# Patient Record
Sex: MALE | Race: WHITE | NOT HISPANIC OR LATINO | Employment: OTHER | URBAN - METROPOLITAN AREA
[De-identification: names, ages, dates, MRNs, and addresses within clinical notes are randomized per-mention and may not be internally consistent; named-entity substitution may affect disease eponyms.]

---

## 2019-11-13 ENCOUNTER — TRANSCRIBE ORDERS (OUTPATIENT)
Dept: ADMINISTRATIVE | Facility: HOSPITAL | Age: 74
End: 2019-11-13

## 2019-11-13 DIAGNOSIS — M19.011 PRIMARY OSTEOARTHRITIS OF RIGHT SHOULDER: Primary | ICD-10-CM

## 2019-11-18 ENCOUNTER — HOSPITAL ENCOUNTER (OUTPATIENT)
Dept: CT IMAGING | Facility: HOSPITAL | Age: 74
Discharge: HOME | End: 2019-11-18
Admitting: ORTHOPAEDIC SURGERY

## 2019-11-18 ENCOUNTER — APPOINTMENT (OUTPATIENT)
Dept: PREADMISSION TESTING | Facility: HOSPITAL | Age: 74
End: 2019-11-18

## 2019-11-18 ENCOUNTER — HOSPITAL ENCOUNTER (OUTPATIENT)
Dept: GENERAL RADIOLOGY | Facility: HOSPITAL | Age: 74
Discharge: HOME | End: 2019-11-18

## 2019-11-18 VITALS — WEIGHT: 162.92 LBS | BODY MASS INDEX: 24.69 KG/M2 | HEIGHT: 68 IN

## 2019-11-18 DIAGNOSIS — M19.011 PRIMARY OSTEOARTHRITIS OF RIGHT SHOULDER: ICD-10-CM

## 2019-11-18 LAB
ANION GAP SERPL CALCULATED.3IONS-SCNC: 13 MMOL/L (ref 5–15)
BUN BLD-MCNC: 31 MG/DL (ref 8–23)
BUN/CREAT SERPL: 25.4 (ref 7–25)
CALCIUM SPEC-SCNC: 9.8 MG/DL (ref 8.6–10.5)
CHLORIDE SERPL-SCNC: 100 MMOL/L (ref 98–107)
CO2 SERPL-SCNC: 25 MMOL/L (ref 22–29)
CREAT BLD-MCNC: 1.22 MG/DL (ref 0.76–1.27)
DEPRECATED RDW RBC AUTO: 44.4 FL (ref 37–54)
ERYTHROCYTE [DISTWIDTH] IN BLOOD BY AUTOMATED COUNT: 12.7 % (ref 12.3–15.4)
GFR SERPL CREATININE-BSD FRML MDRD: 58 ML/MIN/1.73
GLUCOSE BLD-MCNC: 258 MG/DL (ref 65–99)
HBA1C MFR BLD: 7.1 % (ref 4.8–5.6)
HCT VFR BLD AUTO: 42.1 % (ref 37.5–51)
HGB BLD-MCNC: 13.9 G/DL (ref 13–17.7)
MCH RBC QN AUTO: 31.1 PG (ref 26.6–33)
MCHC RBC AUTO-ENTMCNC: 33 G/DL (ref 31.5–35.7)
MCV RBC AUTO: 94.2 FL (ref 79–97)
PLATELET # BLD AUTO: 268 10*3/MM3 (ref 140–450)
PMV BLD AUTO: 9.5 FL (ref 6–12)
POTASSIUM BLD-SCNC: 4.6 MMOL/L (ref 3.5–5.2)
RBC # BLD AUTO: 4.47 10*6/MM3 (ref 4.14–5.8)
SODIUM BLD-SCNC: 138 MMOL/L (ref 136–145)
WBC NRBC COR # BLD: 8.05 10*3/MM3 (ref 3.4–10.8)

## 2019-11-18 PROCEDURE — 36415 COLL VENOUS BLD VENIPUNCTURE: CPT

## 2019-11-18 PROCEDURE — 85027 COMPLETE CBC AUTOMATED: CPT | Performed by: ORTHOPAEDIC SURGERY

## 2019-11-18 PROCEDURE — 93010 ELECTROCARDIOGRAM REPORT: CPT | Performed by: INTERNAL MEDICINE

## 2019-11-18 PROCEDURE — 80048 BASIC METABOLIC PNL TOTAL CA: CPT | Performed by: ORTHOPAEDIC SURGERY

## 2019-11-18 PROCEDURE — 73200 CT UPPER EXTREMITY W/O DYE: CPT

## 2019-11-18 PROCEDURE — 71046 X-RAY EXAM CHEST 2 VIEWS: CPT

## 2019-11-18 PROCEDURE — 93005 ELECTROCARDIOGRAM TRACING: CPT

## 2019-11-18 PROCEDURE — 83036 HEMOGLOBIN GLYCOSYLATED A1C: CPT | Performed by: ORTHOPAEDIC SURGERY

## 2019-11-18 RX ORDER — ASCORBIC ACID 500 MG
500 TABLET ORAL DAILY
COMMUNITY

## 2019-11-18 RX ORDER — ALLOPURINOL 100 MG/1
200 TABLET ORAL DAILY
COMMUNITY

## 2019-11-18 RX ORDER — MELATONIN
2000 DAILY
COMMUNITY

## 2019-11-18 RX ORDER — OXYBUTYNIN CHLORIDE 5 MG/1
5 TABLET, EXTENDED RELEASE ORAL DAILY
COMMUNITY

## 2019-11-18 RX ORDER — SIMVASTATIN 80 MG
40 TABLET ORAL NIGHTLY
COMMUNITY

## 2019-11-18 RX ORDER — LOSARTAN POTASSIUM 25 MG/1
50 TABLET ORAL DAILY
COMMUNITY

## 2019-11-18 RX ORDER — FLUTICASONE PROPIONATE 50 MCG
2 SPRAY, SUSPENSION (ML) NASAL DAILY
COMMUNITY

## 2019-11-18 RX ORDER — BIOTIN 1 MG
5000 TABLET ORAL DAILY
COMMUNITY

## 2019-11-18 RX ORDER — NAPROXEN 500 MG/1
500 TABLET ORAL AS NEEDED
COMMUNITY

## 2019-11-18 RX ORDER — OMEPRAZOLE 20 MG/1
20 CAPSULE, DELAYED RELEASE ORAL DAILY
COMMUNITY

## 2019-11-18 NOTE — PAT
Patient given a prescription for Benzol Peroxide 5% wash during PAT visit.  Instructed them to fill the prescription or  from [facility] pharmacy if was submitted electronically by the surgeon's office.  Verbal and written instructions given regarding proper use of the Benzoyl Peroxide wash given to patient and/or famlily during PAT visit. Patient/family also instructed to complete checklist and return it to Pre-op on the day of surgery.  Patient and/or family verbalized understanding.      Additionally, reinforced with patient to acquire this prescription from the [facility] retail pharmacy before leaving the hospital after PAT visit due to the potential unavailability at local pharmacies.      Per Anesthesia Request, patient instructed not to take their ACE/ARB medications on the AM of surgery.      Patient instructed to drink 20 ounces (or until full) of Gatorade and it needs to be completed 1 hour before given arrival time for procedure (NO RED Gatorade)    Patient verbalized understanding.

## 2019-12-01 ENCOUNTER — ANESTHESIA EVENT (OUTPATIENT)
Dept: PERIOP | Facility: HOSPITAL | Age: 74
End: 2019-12-01

## 2019-12-01 RX ORDER — FAMOTIDINE 10 MG/ML
20 INJECTION, SOLUTION INTRAVENOUS ONCE
Status: CANCELLED | OUTPATIENT
Start: 2019-12-01 | End: 2019-12-01

## 2019-12-02 ENCOUNTER — HOSPITAL ENCOUNTER (INPATIENT)
Facility: HOSPITAL | Age: 74
LOS: 1 days | Discharge: HOME | End: 2019-12-03
Attending: ORTHOPAEDIC SURGERY | Admitting: ORTHOPAEDIC SURGERY

## 2019-12-02 ENCOUNTER — APPOINTMENT (OUTPATIENT)
Dept: GENERAL RADIOLOGY | Facility: HOSPITAL | Age: 74
End: 2019-12-02

## 2019-12-02 ENCOUNTER — ANESTHESIA (OUTPATIENT)
Dept: PERIOP | Facility: HOSPITAL | Age: 74
End: 2019-12-02

## 2019-12-02 DIAGNOSIS — Z78.9 IMPAIRED MOBILITY AND ADLS: Primary | ICD-10-CM

## 2019-12-02 DIAGNOSIS — Z74.09 IMPAIRED MOBILITY AND ADLS: Primary | ICD-10-CM

## 2019-12-02 PROBLEM — Z86.39 H/O NON-INSULIN DEPENDENT DIABETES MELLITUS: Status: ACTIVE | Noted: 2019-12-02

## 2019-12-02 PROBLEM — Z96.611 STATUS POST REVERSE ARTHROPLASTY OF RIGHT SHOULDER: Status: ACTIVE | Noted: 2019-12-02

## 2019-12-02 PROBLEM — M10.9 GOUT: Status: ACTIVE | Noted: 2019-12-02

## 2019-12-02 PROBLEM — I10 ESSENTIAL HYPERTENSION: Status: ACTIVE | Noted: 2019-12-02

## 2019-12-02 PROBLEM — K21.9 GERD WITHOUT ESOPHAGITIS: Status: ACTIVE | Noted: 2019-12-02

## 2019-12-02 PROBLEM — E78.5 HYPERLIPIDEMIA: Status: ACTIVE | Noted: 2019-12-02

## 2019-12-02 LAB
GLUCOSE BLDC GLUCOMTR-MCNC: 153 MG/DL (ref 70–130)
GLUCOSE BLDC GLUCOMTR-MCNC: 174 MG/DL (ref 70–130)
GLUCOSE BLDC GLUCOMTR-MCNC: 291 MG/DL (ref 70–130)
GLUCOSE BLDC GLUCOMTR-MCNC: 307 MG/DL (ref 70–130)

## 2019-12-02 PROCEDURE — 25010000002 PHENYLEPHRINE PER 1 ML: Performed by: NURSE ANESTHETIST, CERTIFIED REGISTERED

## 2019-12-02 PROCEDURE — 25010000003 LIDOCAINE 1 % SOLUTION: Performed by: NURSE ANESTHETIST, CERTIFIED REGISTERED

## 2019-12-02 PROCEDURE — 25010000002 FENTANYL CITRATE (PF) 100 MCG/2ML SOLUTION: Performed by: NURSE ANESTHETIST, CERTIFIED REGISTERED

## 2019-12-02 PROCEDURE — C1776 JOINT DEVICE (IMPLANTABLE): HCPCS | Performed by: ORTHOPAEDIC SURGERY

## 2019-12-02 PROCEDURE — 97535 SELF CARE MNGMENT TRAINING: CPT | Performed by: OCCUPATIONAL THERAPIST

## 2019-12-02 PROCEDURE — 25010000003 CEFAZOLIN IN DEXTROSE 2-4 GM/100ML-% SOLUTION: Performed by: ORTHOPAEDIC SURGERY

## 2019-12-02 PROCEDURE — 99222 1ST HOSP IP/OBS MODERATE 55: CPT | Performed by: NURSE PRACTITIONER

## 2019-12-02 PROCEDURE — 0RRJ00Z REPLACEMENT OF RIGHT SHOULDER JOINT WITH REVERSE BALL AND SOCKET SYNTHETIC SUBSTITUTE, OPEN APPROACH: ICD-10-PCS | Performed by: ORTHOPAEDIC SURGERY

## 2019-12-02 PROCEDURE — 82962 GLUCOSE BLOOD TEST: CPT

## 2019-12-02 PROCEDURE — 97166 OT EVAL MOD COMPLEX 45 MIN: CPT | Performed by: OCCUPATIONAL THERAPIST

## 2019-12-02 PROCEDURE — 25010000002 DEXAMETHASONE PER 1 MG: Performed by: NURSE ANESTHETIST, CERTIFIED REGISTERED

## 2019-12-02 PROCEDURE — 93010 ELECTROCARDIOGRAM REPORT: CPT | Performed by: INTERNAL MEDICINE

## 2019-12-02 PROCEDURE — 25010000002 ONDANSETRON PER 1 MG: Performed by: NURSE ANESTHETIST, CERTIFIED REGISTERED

## 2019-12-02 PROCEDURE — 63710000001 INSULIN LISPRO (HUMAN) PER 5 UNITS: Performed by: NURSE PRACTITIONER

## 2019-12-02 PROCEDURE — 25010000002 ROPIVACINE HCL-NACL: Performed by: NURSE ANESTHETIST, CERTIFIED REGISTERED

## 2019-12-02 PROCEDURE — 25010000002 VANCOMYCIN PER 500 MG: Performed by: ORTHOPAEDIC SURGERY

## 2019-12-02 PROCEDURE — 94799 UNLISTED PULMONARY SVC/PX: CPT

## 2019-12-02 PROCEDURE — 25010000002 BUPRENORPHINE PER 0.1 MG: Performed by: ANESTHESIOLOGY

## 2019-12-02 PROCEDURE — 93005 ELECTROCARDIOGRAM TRACING: CPT | Performed by: ANESTHESIOLOGY

## 2019-12-02 PROCEDURE — 25010000002 DEXAMETHASONE SODIUM PHOSPHATE 10 MG/ML SOLUTION: Performed by: ANESTHESIOLOGY

## 2019-12-02 PROCEDURE — 97110 THERAPEUTIC EXERCISES: CPT | Performed by: OCCUPATIONAL THERAPIST

## 2019-12-02 PROCEDURE — 73030 X-RAY EXAM OF SHOULDER: CPT

## 2019-12-02 PROCEDURE — 25010000002 PROPOFOL 10 MG/ML EMULSION: Performed by: NURSE ANESTHETIST, CERTIFIED REGISTERED

## 2019-12-02 PROCEDURE — 97530 THERAPEUTIC ACTIVITIES: CPT | Performed by: OCCUPATIONAL THERAPIST

## 2019-12-02 PROCEDURE — 25010000002 NEOSTIGMINE 10 MG/10ML SOLUTION: Performed by: NURSE ANESTHETIST, CERTIFIED REGISTERED

## 2019-12-02 DEVICE — LINER HUM EQUINOXE REV SHLDR 42 PLS0: Type: IMPLANTABLE DEVICE | Site: SHOULDER | Status: FUNCTIONAL

## 2019-12-02 DEVICE — TOTL SHLDR AUG PLT ARTHROPLASTY UPCHRG: Type: IMPLANTABLE DEVICE | Site: SHOULDER | Status: FUNCTIONAL

## 2019-12-02 DEVICE — SCRW COMPR EQUINOXE LK 4.5X18MM: Type: IMPLANTABLE DEVICE | Site: SHOULDER | Status: FUNCTIONAL

## 2019-12-02 DEVICE — GLENOSPHERE SHLDR/REV EQUINOXE 42MM: Type: IMPLANTABLE DEVICE | Site: SHOULDER | Status: FUNCTIONAL

## 2019-12-02 DEVICE — SCRW EQUINOXE TORQ DEFINE REV SHLDR KT: Type: IMPLANTABLE DEVICE | Site: SHOULDER | Status: FUNCTIONAL

## 2019-12-02 DEVICE — IMPLANTABLE DEVICE: Type: IMPLANTABLE DEVICE | Site: SHOULDER | Status: FUNCTIONAL

## 2019-12-02 DEVICE — TRY HUM EQUINOXE ADPT REV SHLDR PLS0: Type: IMPLANTABLE DEVICE | Site: SHOULDER | Status: FUNCTIONAL

## 2019-12-02 DEVICE — SCRW COMPR EQUINOXE LK 4.5X34MM: Type: IMPLANTABLE DEVICE | Site: SHOULDER | Status: FUNCTIONAL

## 2019-12-02 DEVICE — SUT FW #2 W/TPR NDL 1/2 CIR 38IN 97CM 26.5MM BLU: Type: IMPLANTABLE DEVICE | Site: SHOULDER | Status: FUNCTIONAL

## 2019-12-02 DEVICE — SCRW COMPR EQUINOXE LK 4.5X30MM: Type: IMPLANTABLE DEVICE | Site: SHOULDER | Status: FUNCTIONAL

## 2019-12-02 DEVICE — STEM HUM PRI EQUINOXE PRESSFIT 6MM SHT: Type: IMPLANTABLE DEVICE | Site: SHOULDER | Status: FUNCTIONAL

## 2019-12-02 DEVICE — PLT GLEN JNT EQUINOXE AUG POST 8DEG RT: Type: IMPLANTABLE DEVICE | Site: SHOULDER | Status: FUNCTIONAL

## 2019-12-02 DEVICE — SCRW LK EQUINOXE GLENOSPHERE REV/SHLDR: Type: IMPLANTABLE DEVICE | Site: SHOULDER | Status: FUNCTIONAL

## 2019-12-02 RX ORDER — SIMVASTATIN 80 MG
40 TABLET ORAL NIGHTLY
Status: DISCONTINUED | OUTPATIENT
Start: 2019-12-02 | End: 2019-12-03 | Stop reason: HOSPADM

## 2019-12-02 RX ORDER — BISACODYL 5 MG/1
10 TABLET, DELAYED RELEASE ORAL DAILY PRN
Status: DISCONTINUED | OUTPATIENT
Start: 2019-12-02 | End: 2019-12-03 | Stop reason: HOSPADM

## 2019-12-02 RX ORDER — ACETAMINOPHEN 650 MG/1
650 SUPPOSITORY RECTAL EVERY 4 HOURS PRN
Status: DISCONTINUED | OUTPATIENT
Start: 2019-12-02 | End: 2019-12-03 | Stop reason: HOSPADM

## 2019-12-02 RX ORDER — ACETAMINOPHEN 500 MG
1000 TABLET ORAL ONCE
Status: COMPLETED | OUTPATIENT
Start: 2019-12-02 | End: 2019-12-02

## 2019-12-02 RX ORDER — ACETAMINOPHEN 325 MG/1
650 TABLET ORAL EVERY 4 HOURS PRN
Status: DISCONTINUED | OUTPATIENT
Start: 2019-12-02 | End: 2019-12-03 | Stop reason: HOSPADM

## 2019-12-02 RX ORDER — PROMETHAZINE HYDROCHLORIDE 25 MG/1
25 TABLET ORAL ONCE AS NEEDED
Status: DISCONTINUED | OUTPATIENT
Start: 2019-12-02 | End: 2019-12-02 | Stop reason: HOSPADM

## 2019-12-02 RX ORDER — LIDOCAINE HYDROCHLORIDE 10 MG/ML
INJECTION, SOLUTION INFILTRATION; PERINEURAL AS NEEDED
Status: DISCONTINUED | OUTPATIENT
Start: 2019-12-02 | End: 2019-12-02 | Stop reason: SURG

## 2019-12-02 RX ORDER — HYDROMORPHONE HYDROCHLORIDE 1 MG/ML
0.5 INJECTION, SOLUTION INTRAMUSCULAR; INTRAVENOUS; SUBCUTANEOUS
Status: DISCONTINUED | OUTPATIENT
Start: 2019-12-02 | End: 2019-12-03 | Stop reason: HOSPADM

## 2019-12-02 RX ORDER — EPHEDRINE SULFATE 50 MG/ML
5 INJECTION, SOLUTION INTRAVENOUS ONCE AS NEEDED
Status: DISCONTINUED | OUTPATIENT
Start: 2019-12-02 | End: 2019-12-02 | Stop reason: HOSPADM

## 2019-12-02 RX ORDER — ALLOPURINOL 100 MG/1
100 TABLET ORAL 2 TIMES DAILY
Status: DISCONTINUED | OUTPATIENT
Start: 2019-12-02 | End: 2019-12-02

## 2019-12-02 RX ORDER — LOSARTAN POTASSIUM 25 MG/1
25 TABLET ORAL DAILY
Status: DISCONTINUED | OUTPATIENT
Start: 2019-12-02 | End: 2019-12-02

## 2019-12-02 RX ORDER — LIDOCAINE HYDROCHLORIDE 10 MG/ML
0.5 INJECTION, SOLUTION EPIDURAL; INFILTRATION; INTRACAUDAL; PERINEURAL ONCE AS NEEDED
Status: COMPLETED | OUTPATIENT
Start: 2019-12-02 | End: 2019-12-02

## 2019-12-02 RX ORDER — SODIUM CHLORIDE 0.9 % (FLUSH) 0.9 %
3-10 SYRINGE (ML) INJECTION AS NEEDED
Status: DISCONTINUED | OUTPATIENT
Start: 2019-12-02 | End: 2019-12-02 | Stop reason: HOSPADM

## 2019-12-02 RX ORDER — ATROPINE SULFATE 1 MG/ML
0.5 INJECTION, SOLUTION INTRAMUSCULAR; INTRAVENOUS; SUBCUTANEOUS ONCE AS NEEDED
Status: DISCONTINUED | OUTPATIENT
Start: 2019-12-02 | End: 2019-12-02 | Stop reason: HOSPADM

## 2019-12-02 RX ORDER — DEXTROSE MONOHYDRATE 25 G/50ML
25 INJECTION, SOLUTION INTRAVENOUS
Status: DISCONTINUED | OUTPATIENT
Start: 2019-12-02 | End: 2019-12-03 | Stop reason: HOSPADM

## 2019-12-02 RX ORDER — LOSARTAN POTASSIUM 50 MG/1
50 TABLET ORAL DAILY
Status: DISCONTINUED | OUTPATIENT
Start: 2019-12-03 | End: 2019-12-03 | Stop reason: HOSPADM

## 2019-12-02 RX ORDER — ONDANSETRON 2 MG/ML
4 INJECTION INTRAMUSCULAR; INTRAVENOUS EVERY 6 HOURS PRN
Status: DISCONTINUED | OUTPATIENT
Start: 2019-12-02 | End: 2019-12-03 | Stop reason: HOSPADM

## 2019-12-02 RX ORDER — CEFAZOLIN SODIUM 2 G/100ML
2 INJECTION, SOLUTION INTRAVENOUS ONCE
Status: COMPLETED | OUTPATIENT
Start: 2019-12-02 | End: 2019-12-02

## 2019-12-02 RX ORDER — FENTANYL CITRATE 50 UG/ML
50 INJECTION, SOLUTION INTRAMUSCULAR; INTRAVENOUS
Status: DISCONTINUED | OUTPATIENT
Start: 2019-12-02 | End: 2019-12-02 | Stop reason: HOSPADM

## 2019-12-02 RX ORDER — NICOTINE POLACRILEX 4 MG
15 LOZENGE BUCCAL
Status: DISCONTINUED | OUTPATIENT
Start: 2019-12-02 | End: 2019-12-03 | Stop reason: HOSPADM

## 2019-12-02 RX ORDER — METFORMIN HYDROCHLORIDE 500 MG/1
1000 TABLET, EXTENDED RELEASE ORAL 2 TIMES DAILY WITH MEALS
Status: DISCONTINUED | OUTPATIENT
Start: 2019-12-02 | End: 2019-12-03 | Stop reason: HOSPADM

## 2019-12-02 RX ORDER — NALOXONE HCL 0.4 MG/ML
0.1 VIAL (ML) INJECTION
Status: DISCONTINUED | OUTPATIENT
Start: 2019-12-02 | End: 2019-12-03 | Stop reason: HOSPADM

## 2019-12-02 RX ORDER — NEOSTIGMINE METHYLSULFATE 1 MG/ML
INJECTION, SOLUTION INTRAVENOUS AS NEEDED
Status: DISCONTINUED | OUTPATIENT
Start: 2019-12-02 | End: 2019-12-02 | Stop reason: SURG

## 2019-12-02 RX ORDER — VANCOMYCIN HYDROCHLORIDE 1 G/200ML
1000 INJECTION, SOLUTION INTRAVENOUS ONCE
Status: COMPLETED | OUTPATIENT
Start: 2019-12-02 | End: 2019-12-02

## 2019-12-02 RX ORDER — DOCUSATE SODIUM 100 MG/1
100 CAPSULE, LIQUID FILLED ORAL 2 TIMES DAILY PRN
Status: DISCONTINUED | OUTPATIENT
Start: 2019-12-02 | End: 2019-12-03 | Stop reason: HOSPADM

## 2019-12-02 RX ORDER — ALLOPURINOL 100 MG/1
200 TABLET ORAL DAILY
Status: DISCONTINUED | OUTPATIENT
Start: 2019-12-03 | End: 2019-12-03 | Stop reason: HOSPADM

## 2019-12-02 RX ORDER — FENTANYL CITRATE 50 UG/ML
INJECTION, SOLUTION INTRAMUSCULAR; INTRAVENOUS
Status: COMPLETED | OUTPATIENT
Start: 2019-12-02 | End: 2019-12-02

## 2019-12-02 RX ORDER — SODIUM CHLORIDE, SODIUM LACTATE, POTASSIUM CHLORIDE, CALCIUM CHLORIDE 600; 310; 30; 20 MG/100ML; MG/100ML; MG/100ML; MG/100ML
9 INJECTION, SOLUTION INTRAVENOUS CONTINUOUS
Status: DISCONTINUED | OUTPATIENT
Start: 2019-12-02 | End: 2019-12-03 | Stop reason: HOSPADM

## 2019-12-02 RX ORDER — DEXAMETHASONE SODIUM PHOSPHATE 4 MG/ML
INJECTION, SOLUTION INTRA-ARTICULAR; INTRALESIONAL; INTRAMUSCULAR; INTRAVENOUS; SOFT TISSUE AS NEEDED
Status: DISCONTINUED | OUTPATIENT
Start: 2019-12-02 | End: 2019-12-02 | Stop reason: SURG

## 2019-12-02 RX ORDER — PROMETHAZINE HYDROCHLORIDE 25 MG/ML
6.25 INJECTION, SOLUTION INTRAMUSCULAR; INTRAVENOUS ONCE AS NEEDED
Status: DISCONTINUED | OUTPATIENT
Start: 2019-12-02 | End: 2019-12-02 | Stop reason: HOSPADM

## 2019-12-02 RX ORDER — PROPOFOL 10 MG/ML
VIAL (ML) INTRAVENOUS AS NEEDED
Status: DISCONTINUED | OUTPATIENT
Start: 2019-12-02 | End: 2019-12-02 | Stop reason: SURG

## 2019-12-02 RX ORDER — ESMOLOL HYDROCHLORIDE 10 MG/ML
INJECTION INTRAVENOUS AS NEEDED
Status: DISCONTINUED | OUTPATIENT
Start: 2019-12-02 | End: 2019-12-02 | Stop reason: SURG

## 2019-12-02 RX ORDER — OXYBUTYNIN CHLORIDE 5 MG/1
5 TABLET, EXTENDED RELEASE ORAL DAILY
Status: DISCONTINUED | OUTPATIENT
Start: 2019-12-02 | End: 2019-12-03 | Stop reason: HOSPADM

## 2019-12-02 RX ORDER — FAMOTIDINE 20 MG/1
20 TABLET, FILM COATED ORAL ONCE
Status: COMPLETED | OUTPATIENT
Start: 2019-12-02 | End: 2019-12-02

## 2019-12-02 RX ORDER — SODIUM CHLORIDE 0.9 % (FLUSH) 0.9 %
3 SYRINGE (ML) INJECTION EVERY 12 HOURS SCHEDULED
Status: DISCONTINUED | OUTPATIENT
Start: 2019-12-02 | End: 2019-12-02 | Stop reason: HOSPADM

## 2019-12-02 RX ORDER — ONDANSETRON 2 MG/ML
INJECTION INTRAMUSCULAR; INTRAVENOUS AS NEEDED
Status: DISCONTINUED | OUTPATIENT
Start: 2019-12-02 | End: 2019-12-02 | Stop reason: SURG

## 2019-12-02 RX ORDER — ATRACURIUM BESYLATE 10 MG/ML
INJECTION, SOLUTION INTRAVENOUS AS NEEDED
Status: DISCONTINUED | OUTPATIENT
Start: 2019-12-02 | End: 2019-12-02 | Stop reason: SURG

## 2019-12-02 RX ORDER — DEXAMETHASONE SODIUM PHOSPHATE 10 MG/ML
INJECTION, SOLUTION INTRAMUSCULAR; INTRAVENOUS
Status: COMPLETED | OUTPATIENT
Start: 2019-12-02 | End: 2019-12-02

## 2019-12-02 RX ORDER — PANTOPRAZOLE SODIUM 40 MG/1
40 TABLET, DELAYED RELEASE ORAL
Status: DISCONTINUED | OUTPATIENT
Start: 2019-12-03 | End: 2019-12-03 | Stop reason: HOSPADM

## 2019-12-02 RX ORDER — BUPRENORPHINE HYDROCHLORIDE 0.32 MG/ML
INJECTION INTRAMUSCULAR; INTRAVENOUS
Status: COMPLETED | OUTPATIENT
Start: 2019-12-02 | End: 2019-12-02

## 2019-12-02 RX ORDER — VANCOMYCIN HYDROCHLORIDE 1 G/200ML
15 INJECTION, SOLUTION INTRAVENOUS ONCE
Status: COMPLETED | OUTPATIENT
Start: 2019-12-02 | End: 2019-12-02

## 2019-12-02 RX ORDER — BUPIVACAINE HYDROCHLORIDE 2.5 MG/ML
INJECTION, SOLUTION EPIDURAL; INFILTRATION; INTRACAUDAL
Status: COMPLETED | OUTPATIENT
Start: 2019-12-02 | End: 2019-12-02

## 2019-12-02 RX ORDER — OXYCODONE HYDROCHLORIDE 5 MG/1
5 TABLET ORAL EVERY 4 HOURS PRN
Status: DISCONTINUED | OUTPATIENT
Start: 2019-12-02 | End: 2019-12-03 | Stop reason: HOSPADM

## 2019-12-02 RX ORDER — GLYCOPYRROLATE 0.2 MG/ML
INJECTION INTRAMUSCULAR; INTRAVENOUS AS NEEDED
Status: DISCONTINUED | OUTPATIENT
Start: 2019-12-02 | End: 2019-12-02 | Stop reason: SURG

## 2019-12-02 RX ORDER — ATORVASTATIN CALCIUM 40 MG/1
40 TABLET, FILM COATED ORAL NIGHTLY
Status: DISCONTINUED | OUTPATIENT
Start: 2019-12-02 | End: 2019-12-02

## 2019-12-02 RX ORDER — PROMETHAZINE HYDROCHLORIDE 25 MG/1
25 SUPPOSITORY RECTAL ONCE AS NEEDED
Status: DISCONTINUED | OUTPATIENT
Start: 2019-12-02 | End: 2019-12-02 | Stop reason: HOSPADM

## 2019-12-02 RX ORDER — ONDANSETRON 4 MG/1
4 TABLET, FILM COATED ORAL EVERY 6 HOURS PRN
Status: DISCONTINUED | OUTPATIENT
Start: 2019-12-02 | End: 2019-12-03 | Stop reason: HOSPADM

## 2019-12-02 RX ORDER — CALCIUM CARBONATE 750 MG/1
1500 TABLET, CHEWABLE ORAL 3 TIMES DAILY PRN
Status: DISCONTINUED | OUTPATIENT
Start: 2019-12-02 | End: 2019-12-03 | Stop reason: HOSPADM

## 2019-12-02 RX ORDER — OXYBUTYNIN CHLORIDE 5 MG/1
5 TABLET, EXTENDED RELEASE ORAL DAILY
Status: DISCONTINUED | OUTPATIENT
Start: 2019-12-02 | End: 2019-12-02

## 2019-12-02 RX ORDER — SODIUM CHLORIDE 450 MG/100ML
50 INJECTION, SOLUTION INTRAVENOUS CONTINUOUS
Status: DISCONTINUED | OUTPATIENT
Start: 2019-12-02 | End: 2019-12-03 | Stop reason: HOSPADM

## 2019-12-02 RX ADMIN — BUPIVACAINE HYDROCHLORIDE 30 ML: 2.5 INJECTION, SOLUTION EPIDURAL; INFILTRATION; INTRACAUDAL; PERINEURAL at 10:14

## 2019-12-02 RX ADMIN — TRANEXAMIC ACID 1000 MG: 100 INJECTION, SOLUTION INTRAVENOUS at 11:28

## 2019-12-02 RX ADMIN — NEOSTIGMINE METHYLSULFATE 4 MG: 1 INJECTION, SOLUTION INTRAVENOUS at 11:54

## 2019-12-02 RX ADMIN — SODIUM CHLORIDE 50 ML/HR: 4.5 INJECTION, SOLUTION INTRAVENOUS at 16:45

## 2019-12-02 RX ADMIN — ACETAMINOPHEN 1000 MG: 500 TABLET ORAL at 08:28

## 2019-12-02 RX ADMIN — PHENYLEPHRINE HYDROCHLORIDE 100 MCG: 10 INJECTION INTRAVENOUS at 10:46

## 2019-12-02 RX ADMIN — INSULIN LISPRO 4 UNITS: 100 INJECTION, SOLUTION INTRAVENOUS; SUBCUTANEOUS at 19:08

## 2019-12-02 RX ADMIN — ONDANSETRON 4 MG: 2 INJECTION INTRAMUSCULAR; INTRAVENOUS at 11:41

## 2019-12-02 RX ADMIN — TRANEXAMIC ACID 1000 MG: 100 INJECTION, SOLUTION INTRAVENOUS at 10:19

## 2019-12-02 RX ADMIN — SODIUM CHLORIDE, POTASSIUM CHLORIDE, SODIUM LACTATE AND CALCIUM CHLORIDE 9 ML/HR: 600; 310; 30; 20 INJECTION, SOLUTION INTRAVENOUS at 08:28

## 2019-12-02 RX ADMIN — FAMOTIDINE 20 MG: 20 TABLET ORAL at 08:28

## 2019-12-02 RX ADMIN — GLYCOPYRROLATE 0.4 MG: 0.2 INJECTION, SOLUTION INTRAMUSCULAR; INTRAVENOUS at 11:54

## 2019-12-02 RX ADMIN — CEFAZOLIN SODIUM 2 G: 2 INJECTION, SOLUTION INTRAVENOUS at 09:42

## 2019-12-02 RX ADMIN — DEXAMETHASONE SODIUM PHOSPHATE 8 MG: 4 INJECTION, SOLUTION INTRAMUSCULAR; INTRAVENOUS at 10:11

## 2019-12-02 RX ADMIN — VANCOMYCIN HYDROCHLORIDE 1000 MG: 1 INJECTION, SOLUTION INTRAVENOUS at 21:51

## 2019-12-02 RX ADMIN — BUPIVACAINE HYDROCHLORIDE 5 ML: 2.5 INJECTION, SOLUTION EPIDURAL; INFILTRATION; INTRACAUDAL; PERINEURAL at 11:51

## 2019-12-02 RX ADMIN — PROPOFOL 150 MG: 10 INJECTION, EMULSION INTRAVENOUS at 10:11

## 2019-12-02 RX ADMIN — PROPOFOL 25 MCG/KG/MIN: 10 INJECTION, EMULSION INTRAVENOUS at 10:19

## 2019-12-02 RX ADMIN — METOPROLOL TARTRATE 3 MG: 5 INJECTION, SOLUTION INTRAVENOUS at 10:55

## 2019-12-02 RX ADMIN — LIDOCAINE HYDROCHLORIDE 0.5 ML: 10 INJECTION, SOLUTION EPIDURAL; INFILTRATION; INTRACAUDAL; PERINEURAL at 08:28

## 2019-12-02 RX ADMIN — EPHEDRINE SULFATE 10 MG: 50 INJECTION INTRAMUSCULAR; INTRAVENOUS; SUBCUTANEOUS at 10:26

## 2019-12-02 RX ADMIN — FENTANYL CITRATE 100 MCG: 50 INJECTION, SOLUTION INTRAMUSCULAR; INTRAVENOUS at 08:57

## 2019-12-02 RX ADMIN — INSULIN LISPRO 5 UNITS: 100 INJECTION, SOLUTION INTRAVENOUS; SUBCUTANEOUS at 21:50

## 2019-12-02 RX ADMIN — LIDOCAINE HYDROCHLORIDE 50 MG: 10 INJECTION, SOLUTION INFILTRATION; PERINEURAL at 10:11

## 2019-12-02 RX ADMIN — EPHEDRINE SULFATE 10 MG: 50 INJECTION INTRAMUSCULAR; INTRAVENOUS; SUBCUTANEOUS at 10:23

## 2019-12-02 RX ADMIN — VANCOMYCIN HYDROCHLORIDE 1000 MG: 1 INJECTION, SOLUTION INTRAVENOUS at 09:58

## 2019-12-02 RX ADMIN — DEXAMETHASONE SODIUM PHOSPHATE 2 MG: 10 INJECTION INTRAMUSCULAR; INTRAVENOUS at 10:14

## 2019-12-02 RX ADMIN — BUPIVACAINE HYDROCHLORIDE 15 ML: 2.5 INJECTION, SOLUTION EPIDURAL; INFILTRATION; INTRACAUDAL; PERINEURAL at 08:57

## 2019-12-02 RX ADMIN — ATRACURIUM BESYLATE 40 MG: 10 INJECTION, SOLUTION INTRAVENOUS at 10:11

## 2019-12-02 RX ADMIN — Medication 40 MG: at 21:58

## 2019-12-02 RX ADMIN — BUPRENORPHINE HYDROCHLORIDE 0.3 MG: 0.32 INJECTION INTRAMUSCULAR; INTRAVENOUS at 10:14

## 2019-12-02 RX ADMIN — ROPIVACAINE HYDROCHLORIDE 6 ML/HR: 5 INJECTION, SOLUTION EPIDURAL; INFILTRATION; PERINEURAL at 11:51

## 2019-12-02 RX ADMIN — OXYBUTYNIN CHLORIDE 5 MG: 5 TABLET, EXTENDED RELEASE ORAL at 21:58

## 2019-12-02 RX ADMIN — ESMOLOL HYDROCHLORIDE 50 MG: 10 INJECTION INTRAVENOUS at 10:11

## 2019-12-02 NOTE — PLAN OF CARE
Problem: Patient Care Overview  Goal: Plan of Care Review  Outcome: Ongoing (interventions implemented as appropriate)   12/02/19 1812   Coping/Psychosocial   Plan of Care Reviewed With patient   Plan of Care Review   Progress improving   OTHER   Outcome Summary no complaints of pain, nerve block at 6ml/hr, pt has rested most of the day, states he hasnt slept well in weeks, ambulated with OT, voiding well, VSS. cont to monitor       Problem: Fall Risk (Adult)  Goal: Identify Related Risk Factors and Signs and Symptoms  Outcome: Ongoing (interventions implemented as appropriate)   12/02/19 1812   Fall Risk (Adult)   Related Risk Factors (Fall Risk) fatigue/slow reaction;gait/mobility problems;history of falls;sensory deficits;sleep pattern alteration;environment unfamiliar   Signs and Symptoms (Fall Risk) presence of risk factors       Problem: Shoulder Arthroplasty (Adult)  Goal: Signs and Symptoms of Listed Potential Problems Will be Absent, Minimized or Managed (Shoulder Arthroplasty)  Outcome: Ongoing (interventions implemented as appropriate)   12/02/19 1812   Goal/Outcome Evaluation   Problems Assessed (Shoulder Arthro) all   Problems Present (Shoulder Arthro) functional deficit

## 2019-12-02 NOTE — ANESTHESIA POSTPROCEDURE EVALUATION
Patient: Nhi Hager    Procedure Summary     Date:  12/02/19 Room / Location:   [facility] /  [facility]    Anesthesia Start:  1006 Anesthesia Stop:  1208    Procedure:  RIGHT REVERSE TOTAL SHOULDER ARTHROPLASTY (Right Shoulder) Diagnosis:       Primary osteoarthritis of right shoulder      Right bicipital tenosynovitis      (Primary osteoarthritis of right shoulder [887412])    Surgeon:  Sundar Miranda MD Provider:  Brigitte Leal MD    Anesthesia Type:  general with block ASA Status:  3          Anesthesia Type: general with block  Last vitals  BP   147/91 (12/02/19 0820)   Temp   97.6 °F (36.4 °C) (12/02/19 0820)   Pulse   90 (12/02/19 0820)   Resp   18 (12/02/19 0820)     SpO2   96 % (12/02/19 0820)     Post Anesthesia Care and Evaluation    Patient location during evaluation: PACU  Patient participation: complete - patient participated  Level of consciousness: awake and alert  Pain score: 0  Pain management: adequate  Airway patency: patent  Anesthetic complications: No anesthetic complications  PONV Status: none  Cardiovascular status: hemodynamically stable and acceptable  Respiratory status: nonlabored ventilation, acceptable and nasal cannula  Hydration status: acceptable

## 2019-12-02 NOTE — ANESTHESIA PROCEDURE NOTES
Peripheral Block      Patient reassessed immediately prior to procedure    Patient location during procedure: OR  Start time: 12/2/2019 10:12 AM  Stop time: 12/2/2019 10:14 AM  Reason for block: at surgeon's request and post-op pain management  Performed by  CRNA: Cecilio Jimenez, CRNA  Assisted by: Gurvinder Cox RN  Preanesthetic Checklist  Completed: patient identified, site marked, surgical consent, pre-op evaluation, timeout performed, IV checked, risks and benefits discussed and monitors and equipment checked  Prep:  Pt Position: supine  Sterile barriers:cap, gloves, gown and mask  Prep: ChloraPrep  Patient monitoring: blood pressure monitoring, continuous pulse oximetry and EKG  Procedure  Performed under: general  Guidance:ultrasound guided and landmark technique  Images:still images obtained, printed/placed on chart    Laterality:right  Block Type:PECS I and PECS II  Injection Technique:single-shot  Needle Type:short-bevel  Needle Gauge:20 G  Resistance on Injection: none    Medications Used: buprenorphine (BUPRENEX) injection, 0.3 mg  dexamethasone sodium phosphate injection, 2 mg  bupivacaine PF (MARCAINE) 0.25 % injection, 30 mL  Med admintered at 12/2/2019 10:14 AM      Medications  Preservative Free Saline:10ml    Post Assessment  Injection Assessment: negative aspiration for heme and incremental injection  Patient Tolerance:comfortable throughout block  Complications:no  Additional Notes  The pt. Was placed in the Supine Position and GA was induced     The insertion site was prepped with CHG and Ultrasound guidance with In-Plane techniquewas  a 4inch BBraun 360 degree echogenic needle was visualized.  Normal Saline PSF was  utilized for hydrodissection of tissue. PECS 1 Block- Pectoralis Major and Minor where identified and LA was injected between PMM and PmM at the level of the 3rd Rib(10ml),  PECS 2-  Pectoralis Minor and Serratus muscle where identified and the needle was advanced laterally  in-plane with the 4th rib as a backstop, pleura was monitored.  LA was injected between SA and PmM at the level of 4th rib( 20ml).  LA injection spread was visualized, injection was incremental 1-5ml, normal or low injection pressure, no intravascular injection, no pneumothorax appreciated.  Thank You.

## 2019-12-02 NOTE — H&P
Pre-Op H&P  Nhi Hager  [id number]  1945    Chief complaint: Right shoulder pain    HPI:  Patient is a 74 y.o.male who presents with primary osteoarthritis of the right shoulder.  The symptoms have been present for two years and are associated with popping and grinding.  The symptoms are described as mild to severe and are exacerbated with motion and are now affecting his activities of daily living and quality of life.      X-ray imaging was performed with findings that demonstrate advanced glenohumeral arthrosis with mild medialization and posterior glenoid wear.    He presents today for a total shoulder arthroplasty, possible reverse, right    Review of Systems:  General ROS: negative for chills, fever or skin lesions;  No changes since last office visit  Cardiovascular ROS: no chest pain or dyspnea on exertion  Respiratory ROS: no cough, shortness of breath, or wheezing    Allergies:   Allergies   Allergen Reactions   • Ciprofloxacin GI Intolerance   • Levofloxacin GI Intolerance       Home Meds:    No current facility-administered medications on file prior to encounter.      No current outpatient medications on file prior to encounter.       PMH:   Past Medical History:   Diagnosis Date   • Arthritis    • Balance problem     vertigo   • Decreased lung capacity    • Diabetes mellitus (CMS/HCC)     checks sugar once per week    • Hepatitis A    • History of transfusion    • Hypertension    • Vertigo     h/o   • Wears glasses      PSH:    Past Surgical History:   Procedure Laterality Date   • ACHILLES TENDON SURGERY      right    • CATARACT EXTRACTION      bilat    • COLONOSCOPY     • CORONARY ARTERY BYPASS GRAFT      4 vessles    • HERNIA REPAIR      bilat    • JOINT REPLACEMENT      bila hip    • SHOULDER ARTHROTOMY      bilat    • SINUS SURGERY      just cleaned out    • TONSILLECTOMY     • WISDOM TOOTH EXTRACTION         Social History:   Tobacco:   Social History     Tobacco Use   Smoking Status  Never Smoker   Smokeless Tobacco Never Used      Alcohol:     Social History     Substance and Sexual Activity   Alcohol Use Yes   • Frequency: Never    Comment: social        Vitals:           /91 (BP Location: Left arm, Patient Position: Sitting)   Pulse 90   Temp 97.6 °F (36.4 °C) (Temporal)   Resp 18   SpO2 96%     Physical Exam:  General Appearance:    Alert, cooperative, no distress, appears stated age   Head:    Normocephalic, without obvious abnormality, atraumatic   Lungs:     Clear to auscultation bilaterally, respirations unlabored    Heart:   Regular rate and rhythm, S1 and S2 normal, no murmur, rub    or gallop    Abdomen:    Soft, non-tender.  +bowel sounds   Breast Exam:    deferred   Genitalia:    deferred   Extremities:   Extremities normal, atraumatic, no cyanosis or edema   Skin:   Skin color, texture, turgor normal, no rashes or lesions   Neurologic:   Grossly intact   Results Review  LABS:  Lab Results   Component Value Date    WBC 8.05 11/18/2019    HGB 13.9 11/18/2019    HCT 42.1 11/18/2019    MCV 94.2 11/18/2019     11/18/2019    GLUCOSE 258 (H) 11/18/2019    BUN 31 (H) 11/18/2019    CREATININE 1.22 11/18/2019    EGFRIFNONA 58 (L) 11/18/2019     11/18/2019    K 4.6 11/18/2019     11/18/2019    CO2 25.0 11/18/2019    CALCIUM 9.8 11/18/2019       RADIOLOGY:  Imaging Results (Last 72 Hours)     ** No results found for the last 72 hours. **          I reviewed the patient's new clinical results.      Impression:   Primary osteoarthritis of the right shoulder    Plan:   Total shoulder arthroplasty, possible reverse, right    Amanda Montgomery PA-C   12/2/2019   9:40 AM

## 2019-12-02 NOTE — ANESTHESIA PROCEDURE NOTES
Right Intescalene Catheter      Patient reassessed immediately prior to procedure    Patient location during procedure: pre-op  Reason for block: at surgeon's request and post-op pain management  Performed by  CRNA: Africa Bautista CRNA  Assisted by: Karen Salgado RN  Preanesthetic Checklist  Completed: patient identified, site marked, surgical consent, pre-op evaluation, timeout performed, IV checked, risks and benefits discussed and monitors and equipment checked  Prep:  Pt Position: left lateral decubitus  Sterile barriers:cap, gloves, mask and sterile barriers  Prep: ChloraPrep  Patient monitoring: blood pressure monitoring, continuous pulse oximetry and EKG  Procedure  Sedation:yes  Performed under: local infiltration  Guidance:ultrasound guided  Images:still images obtained, printed/placed on chart    Laterality:right  Block Type:interscalene  Injection Technique:catheter  Needle Type:Tuohy and echogenic  Needle Gauge:18 G  Resistance on Injection: none  Catheter Size:20 G (20g)  Catheter at skin depth (cm): 10.5 cm.    Medications Used: fentaNYL citrate (PF) (SUBLIMAZE) injection, 100 mcg  bupivacaine PF (MARCAINE) 0.25 % injection, 15 mL  Med admintered at 12/2/2019 8:57 AM      Post Assessment  Injection Assessment: negative aspiration for heme, no paresthesia on injection and incremental injection  Patient Tolerance:comfortable throughout block  Complications:no  Additional Notes  Procedure:                 The pt was placed in semifowlers position with a slight tilt of the thorax contralateral to the insertion site.  The Insertion Site was prepped and draped in sterile fashion.  The pt was anesthetized with  IV Sedation( see meds) and  Skin and cutaneous tissue was infiltrated and anesthetized with 1% Lidocaine 3 mls via a 25g needle.  Utilizing ultrasound guidance, a BBraun 2 inch 18 g Contiplex echogenic touhy needle was advanced in-plane.  Hydro dissection of tissue was achieved with Normal saline.  Major vessels(carotid and Internal Jugular) where visualized as the brachial plexus was approached at the approximate level of C-7/ T-1.  Cervical 5 and Branches of Cervical 6 nerve roots where visualized and the needle tip was placed posterior at the level of C-6 roots.  LA spread was visualized and injection was made incrementally every 5 mls with aspiration. Injection pressure was normal or little, there was no intraneural injection, no vascular injection.      The BBraun 20 g wire stylet  catheter was then placed under US guidance on the posterior aspect of the Brachial Plexus.  Location of catheter was confirmed with NS injection visualized with US . The tuohy was then removed and the skin was sealed with Skin AFix at catheter insertion site.  Skin was prepped with mastisol and the labeled catheter  was secured with steristrips and a CHG tegaderm. Thank You.

## 2019-12-02 NOTE — BRIEF OP NOTE
TOTAL SHOULDER REVERSE ARTHROPLASTY  Progress Note    Nhi Hager  12/2/2019    Pre-op Diagnosis:   Primary osteoarthritis of right shoulder [109708]       Post-Op Diagnosis Codes:     * Primary osteoarthritis of right shoulder [M19.011]     * Right bicipital tenosynovitis [M75.21]    Procedure/CPT® Codes:  AL RECONSTR TOTAL SHOULDER IMPLANT [58373]  AL REPAIR BICEPS LONG TENDON [23351]    Procedure(s):  RIGHT REVERSE TOTAL SHOULDER ARTHROPLASTY    Surgeon(s):  Garcia He MD Sajadi, MD Neptali Cornejo Jose Jimenez, MD    Anesthesia: General with Block    Staff:   Circulator: Gurvinder Cox RN; Sindy Ledezma RN  Scrub Person: Tanisha Corral Paula M  Vendor Representative: German Melchor  Nursing Assistant: Janeth Hancock    Estimated Blood Loss: 100ml    Urine Voided: * No values recorded between 12/2/2019 10:05 AM and 12/2/2019 11:43 AM *    Specimens:                None          Drains:      Findings: per dictation    Complications: none      Sundar Miranda MD     Date: 12/2/2019  Time: 11:43 AM

## 2019-12-02 NOTE — CONSULTS
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 Patient has not experienced any immediate post operative complications, has worked with PT/OT, tolerated diet and urinated. Patient reports pain as controlled and feeling comfortable. Patient states was in usual state of health prior to procedure today.  Hospital Medicine consulted for medical management      Review of Systems   Constitutional: Negative.    HENT: Negative.    Eyes: Negative.    Respiratory: Negative.    Cardiovascular: Negative.    Gastrointestinal: Negative.    Endocrine: Negative.    Genitourinary: Negative.    Musculoskeletal: Positive for arthralgias.   Skin: Negative.    Neurological: Negative.    Psychiatric/Behavioral: Negative.          Otherwise complete ROS reviewed and is negative except as mentioned in the HPI.    Past Medical History:   Diagnosis Date   • Arthritis    • Balance problem     vertigo   • Decreased lung capacity    • Diabetes mellitus (CMS/HCC)     checks sugar once per week    • Hepatitis A    • History of transfusion    • Hypertension    • Vertigo     h/o   • Wears glasses        Past Surgical History:   Procedure Laterality Date   • ACHILLES TENDON SURGERY      right    • CATARACT EXTRACTION      bilat    • COLONOSCOPY     • CORONARY ARTERY BYPASS GRAFT      4 vessles    • HERNIA REPAIR      bilat    • JOINT REPLACEMENT      bila hip    • SHOULDER ARTHROTOMY      bilat    • SINUS SURGERY      just cleaned out    •  TONSILLECTOMY     • WISDOM TOOTH EXTRACTION         Family History: family history is not on file. Otherwise pertinent FHx was reviewed and unremarkable.     Social History:  reports that he has never smoked. He has never used smokeless tobacco. He reports that he drinks alcohol. He reports that he does not use drugs.    Medications:  Medications Prior to Admission   Medication Sig Dispense Refill Last Dose   • allopurinol (ZYLOPRIM) 100 MG tablet Take 100 mg by mouth 2 (Two) Times a Day.   12/2/2019 at 0600   • benzoyl peroxide (benzoyl peroxide) 5 % external liquid Apply three times prior to surgery as directed 148 g 0 12/2/2019   • Biotin 1000 MCG tablet Take 1,000 mcg by mouth Daily.   12/1/2019   • CBD (cannabidiol) oral oil Take 1 drop by mouth Daily.   12/1/2019   • cholecalciferol (VITAMIN D3) 25 MCG (1000 UT) tablet Take 1,000 Units by mouth Daily.   12/1/2019   • fluticasone (FLONASE) 50 MCG/ACT nasal spray 2 sprays into the nostril(s) as directed by provider Daily.   12/2/2019 at 0600   • losartan (COZAAR) 25 MG tablet Take 25 mg by mouth Daily.   12/1/2019   • metFORMIN (GLUCOPHAGE) 500 MG tablet Take 500 mg by mouth 2 (Two) Times a Day With Meals.   12/2/2019 at 0600   • omeprazole (priLOSEC) 20 MG capsule Take 20 mg by mouth Daily.   12/2/2019 at 0600   • oxybutynin XL (DITROPAN-XL) 5 MG 24 hr tablet Take 5 mg by mouth Daily.   12/1/2019   • simvastatin (ZOCOR) 80 MG tablet Take 40 mg by mouth Every Night.   12/1/2019   • vitamin C (ASCORBIC ACID) 500 MG tablet Take 500 mg by mouth Daily.   12/1/2019   • naproxen (NAPROSYN) 500 MG tablet Take 500 mg by mouth As Needed for Mild Pain .   11/25/2019       Scheduled Meds:    allopurinol 100 mg Oral BID   atorvastatin 40 mg Oral Nightly   insulin lispro 0-7 Units Subcutaneous 4x Daily With Meals & Nightly   losartan 25 mg Oral Daily   metFORMIN 500 mg Oral BID With Meals   oxybutynin XL 5 mg Oral Daily   [START ON 12/3/2019] pantoprazole 40 mg Oral QAM AC    vancomycin 15 mg/kg Intravenous Once     Continuous Infusions:    lactated ringers 9 mL/hr Last Rate: 9 mL/hr (12/02/19 0828)   Ropivacine HCl-NaCl 6 mL/hr Last Rate: 6 mL/hr (12/02/19 1151)   sodium chloride 50 mL/hr      PRN Meds:.•  acetaminophen **OR** acetaminophen  •  bisacodyl  •  calcium carbonate EX  •  dextrose  •  dextrose  •  docusate sodium  •  glucagon (human recombinant)  •  HYDROmorphone **AND** naloxone  •  magnesium hydroxide  •  ondansetron **OR** ondansetron  •  oxyCODONE    Allergies   Allergen Reactions   • Ciprofloxacin GI Intolerance   • Levofloxacin GI Intolerance       Objective   Objective     Vital Signs:   Temp:  [96.7 °F (35.9 °C)-97.6 °F (36.4 °C)] 96.7 °F (35.9 °C)  Heart Rate:  [52-90] 57  Resp:  [14-18] 16  BP: (110-154)/(63-91) 137/79     Physical Exam  Constitutional: Awake, alert- walking from restroom to bed with PT on exam  Eyes: PERRLA, sclerae anicteric, no conjunctival injection  HENT: NCAT, mucous membranes moist  Neck: Supple, no thyromegaly, no lymphadenopathy, trachea midline  Respiratory: Clear to auscultation bilaterally, nonlabored respirations   Cardiovascular: RRR, no murmurs, rubs, or gallops, palpable pedal pulses bilaterally  Gastrointestinal: Positive bowel sounds, soft, nontender, nondistended  Musculoskeletal: No bilateral ankle edema, no clubbing or cyanosis to extremities- RUE in imobilizer sling  Psychiatric: Appropriate affect, cooperative  Neurologic: Oriented x 3, strength symmetric in all extremities, Cranial Nerves grossly intact to confrontation, speech clear  Skin: No rashes    Results Reviewed:  I have personally reviewed current lab, radiology, and data and agree.              Invalid input(s):  ALKPHOS, TROPONININT  Estimated Creatinine Clearance: 54.7 mL/min (by C-G formula based on SCr of 1.22 mg/dL).  Brief Urine Lab Results     None        No results found for: BNP    Microbiology Results Abnormal     None          Imaging Results (Last 24  Hours)     Procedure Component Value Units Date/Time    XR Shoulder 2+ View Right [[id number]] Collected:  12/02/19 1425     Updated:  12/02/19 1428    Narrative:       EXAMINATION: XR SHOULDER 2+ VW RIGHT- 12/02/2019     INDICATION: postop      COMPARISON: NONE     FINDINGS: 2 views of the right shoulder reveal patient to be status post  total right shoulder arthroplasty. There is no hardware complication. No  malalignment identified of the prosthesis with postsurgical changes seen  in the soft tissues. Drainage catheter is in place.           Impression:       Patient status post total right shoulder arthroplasty with  postsurgical changes seen in the soft tissues. No hardware complication  or malalignment identified of the prosthesis.           D:  12/02/2019  E:  12/02/2019                Assessment/Plan   Assessment / Plan     Active Hospital Problems    Diagnosis POA   • **Status post reverse arthroplasty of right shoulder [Z96.611] Not Applicable   • Essential hypertension [I10] Unknown   • Hyperlipidemia [E78.5] Unknown   • H/O non-insulin dependent diabetes mellitus [Z86.39] Unknown   • Gout [M10.9] Unknown   • GERD without esophagitis [K21.9] Unknown         I recommend the following:    S/P right shoulder repair  -- management per Dr. Miranda  -- mobilize/ PT/OT  -- IS  -- advance diet as tolerates  -- nerve block in place. Continue pain control per surgery  -- am labs    HTN/HLP/ Gout/ GERD  -- resume home medication  --tums prn    T2 DM  -- continue metformin  --ssi as needed  -- A1c 7.1. DM educator          Thank you for allowing [facility] Medicine Service to provide consultative care for your patient, we will continue to follow while clinically appropriate.    Electronically signed by TABBY East, 12/02/19, 3:33 PM.

## 2019-12-02 NOTE — ANESTHESIA PREPROCEDURE EVALUATION
Anesthesia Evaluation     Patient summary reviewed and Nursing notes reviewed   no history of anesthetic complications:  NPO Solid Status: > 8 hours  NPO Liquid Status: > 2 hours           Airway   Mallampati: I  TM distance: >3 FB  Neck ROM: full  No difficulty expected  Dental - normal exam     Pulmonary - negative pulmonary ROS and normal exam   Cardiovascular - normal exam  Exercise tolerance: good (4-7 METS)    ECG reviewed    (+) hypertension, CABG >6 Months, dysrhythmias (questionable episode of AFib in past),   (-) angina, PEREYRA, cardiac stents    ROS comment: Repeat EKG no fusion complexes noted likely artifact    Neuro/Psych- negative ROS  GI/Hepatic/Renal/Endo    (+)  GERD well controlled,  hepatitis A, liver disease, diabetes mellitus,     Musculoskeletal     Abdominal    Substance History      OB/GYN          Other   arthritis,                    Anesthesia Plan    ASA 3     general with block     intravenous induction     Anesthetic plan, all risks, benefits, and alternatives have been provided, discussed and informed consent has been obtained with: patient.  Use of blood products discussed with patient  Consented to blood products.   Plan discussed with CRNA.

## 2019-12-02 NOTE — ANESTHESIA PROCEDURE NOTES
Airway  Urgency: elective    Date/Time: 12/2/2019 10:14 AM  Airway not difficult    General Information and Staff    Patient location during procedure: OR  Anesthesiologist: Brigitte Leal MD    Indications and Patient Condition  Indications for airway management: airway protection    Preoxygenated: yes  MILS not maintained throughout  Mask difficulty assessment: 1 - vent by mask    Final Airway Details  Final airway type: endotracheal airway      Successful airway: ETT  Cuffed: yes   Successful intubation technique: direct laryngoscopy  Endotracheal tube insertion site: oral  Blade: Debbie  Blade size: 3  ETT size (mm): 7.0  Cormack-Lehane Classification: grade I - full view of glottis  Placement verified by: chest auscultation and capnometry   Cuff volume (mL): 8  Measured from: lips  ETT/EBT  to lips (cm): 20  Number of attempts at approach: 1  Assessment: lips, teeth, and gum same as pre-op and atraumatic intubation    Additional Comments  Negative epigastric sounds, Breath sound equal bilaterally with symmetric chest rise and fall

## 2019-12-02 NOTE — OP NOTE
DATE OF OPERATION: 12/02/19  PREOPERATIVE DIAGNOSIS: *Primary osteoarthritis of right shoulder [577132]  POSTOPERATIVE DIAGNOSES:  1. *Primary osteoarthritis of right shoulder [290712]  2. Biceps tenosynovitis.    PROCEDURES PERFORMED:  1.  Right reverse total shoulder arthroplasty.    2.  Right biceps tenodesis.    SURGEON: Sundar Miranda MD  ASSISTANTS:  1. Rosas He MD, PGY-6 Sports Fellow  2. Montez Zelaya MD, PGY-5.    ANESTHESIA: General plus block.    ESTIMATED BLOOD LOSS:100mL.    COMPLICATIONS: None.    DISPOSITION: Recovery room in stable condition.    IMPLANTS: [employer] Equinoxe reverse total shoulder system, 6 mm preserve stem press-fit, 0 metal liner tray, 42 x 0 polyethylene tray, posterior augmented baseplate with 4 screws with locking caps, and a 42 mm glenosphere.    INDICATIONS: This is a 74-year-old male with right shoulder pain and limited function and motion secondary to arthropathy. They have failed conservative treatment and after a discussion of risks, benefits, and alternatives, wished to proceed with shoulder arthroplasty.  DESCRIPTION OF PROCEDURE: On the day of surgery, the patient identified the right shoulder as the correct operative extremity. This was initialed by the surgeon with the patients's acknowledgment. The patient underwent placement of an interscalene block and was taken to the operating room and placed in the supine position. Upon induction of adequate anesthesia, the patient was brought up to the beach chair position and the shoulder and upper extremity were prepped and draped in the usual sterile fashion. Timeout confirmed the correct patient and operative extremity as well as that antibiotics were on board. A standard deltopectoral approach to the shoulder was carried out. It was carried sharply through the skin and subcutaneous tissue. Medial and lateral flaps were developed over the deltopectoral fascia. The cephalic vein was identified and mobilized laterally  with the deltoid. The subdeltoid and subpectoral spaces were mobilized and a blunt retractor was placed deep to this. The clavipectoral fascia was opened on the lateral edge of the conjoined tendon and the retractor was moved deep to this. The leading edge of the pectoralis was released exposing the long head of the biceps. This was tenosynovitic. It was tenodesed to the pectoralis and released proximal to this. The 3 sisters were identified and coagulated. A subscapularis tenotomy was performed 1 cm medial to the lesser tuberosity and rotator interval was released to the glenoid exposing the humeral head. The inferior capsule was released directly off the humerus to allow greater than 90° of external rotation. The anatomic neck was exposed and the humeral head osteotomy was performed in approximately 30° of retroversion. The remainder of the osteophytes were removed. The canal was then entered, reamed, and broached. The final stem impacted in in approximately 30° of retroversion. A head protector was placed. The humerus was subluxed posteriorly. The glenoid exposed. Circumferential labral excision and capsular release were performed. A 270° mobilization of the subscapularis was carried out as well.  A centering hole was drilled.  The glenoid exhibited significant posterior glenoid wear measuring approximately 20 degrees by CT scan.  Given this as well as the general appearance and stiffness of his tissues, the decision was made to proceed to a reverse arthroplasty.  The glenoid was gently reamed both on axis and slightly acentrically and then the large central hole for the baseplate was drilled and the cage glenoid baseplate impacted in.  Next, the inferior screw hole was drilled and a screw placed with excellent purchase.  Next, an manfred-inferior screw was placed, then a postero-inferior screw, then a superior screw placed with acceptable purchase in all.  Locking caps were placed. The glenosphere was then  inserted and locked into place with a set screw.  The humerus was carefully subluxed back anteriorly. A liner tray and polyethylene were placed and trialing was carried out. The appropriate final sizes were chosen and locked into place.  The shoulder was then reduced.  This allowed nearly full passive range of motion with no instability. The joint was copiously irrigated with orthopedic irrigation mixed with Betadine after the final implants were assembled and locked into place.  Passive range range of motion will be full elevation but external rotation will be limited to 30° in the perioperative period. The deltopectoral interval was approximated with 0 Vicryl, the subcutaneous tissue with 2-0 Vicryl, and the skin with Monocryl and Dermabond. A sterile dressing was placed. Anesthesia was reversed and the patient was taken to the recovery room in stable condition. All instrument, needle, and sponge counts were correct.      Sundar Miranda MD*

## 2019-12-03 VITALS
TEMPERATURE: 98.1 F | RESPIRATION RATE: 18 BRPM | SYSTOLIC BLOOD PRESSURE: 130 MMHG | DIASTOLIC BLOOD PRESSURE: 73 MMHG | OXYGEN SATURATION: 96 % | HEART RATE: 79 BPM

## 2019-12-03 LAB
ANION GAP SERPL CALCULATED.3IONS-SCNC: 16 MMOL/L (ref 5–15)
BASOPHILS # BLD AUTO: 0.02 10*3/MM3 (ref 0–0.2)
BASOPHILS NFR BLD AUTO: 0.1 % (ref 0–1.5)
BUN BLD-MCNC: 24 MG/DL (ref 8–23)
BUN/CREAT SERPL: 19.2 (ref 7–25)
CALCIUM SPEC-SCNC: 9 MG/DL (ref 8.6–10.5)
CHLORIDE SERPL-SCNC: 96 MMOL/L (ref 98–107)
CO2 SERPL-SCNC: 22 MMOL/L (ref 22–29)
CREAT BLD-MCNC: 1.25 MG/DL (ref 0.76–1.27)
DEPRECATED RDW RBC AUTO: 45.2 FL (ref 37–54)
EOSINOPHIL # BLD AUTO: 0 10*3/MM3 (ref 0–0.4)
EOSINOPHIL NFR BLD AUTO: 0 % (ref 0.3–6.2)
ERYTHROCYTE [DISTWIDTH] IN BLOOD BY AUTOMATED COUNT: 12.6 % (ref 12.3–15.4)
GFR SERPL CREATININE-BSD FRML MDRD: 56 ML/MIN/1.73
GLUCOSE BLD-MCNC: 216 MG/DL (ref 65–99)
GLUCOSE BLDC GLUCOMTR-MCNC: 186 MG/DL (ref 70–130)
GLUCOSE BLDC GLUCOMTR-MCNC: 202 MG/DL (ref 70–130)
HCT VFR BLD AUTO: 37.4 % (ref 37.5–51)
HGB BLD-MCNC: 12 G/DL (ref 13–17.7)
IMM GRANULOCYTES # BLD AUTO: 0.07 10*3/MM3 (ref 0–0.05)
IMM GRANULOCYTES NFR BLD AUTO: 0.4 % (ref 0–0.5)
LYMPHOCYTES # BLD AUTO: 0.38 10*3/MM3 (ref 0.7–3.1)
LYMPHOCYTES NFR BLD AUTO: 2.4 % (ref 19.6–45.3)
MCH RBC QN AUTO: 31.8 PG (ref 26.6–33)
MCHC RBC AUTO-ENTMCNC: 32.1 G/DL (ref 31.5–35.7)
MCV RBC AUTO: 99.2 FL (ref 79–97)
MONOCYTES # BLD AUTO: 1.09 10*3/MM3 (ref 0.1–0.9)
MONOCYTES NFR BLD AUTO: 6.9 % (ref 5–12)
NEUTROPHILS # BLD AUTO: 14.23 10*3/MM3 (ref 1.7–7)
NEUTROPHILS NFR BLD AUTO: 90.2 % (ref 42.7–76)
NRBC BLD AUTO-RTO: 0 /100 WBC (ref 0–0.2)
PLATELET # BLD AUTO: 227 10*3/MM3 (ref 140–450)
PMV BLD AUTO: 9.8 FL (ref 6–12)
POTASSIUM BLD-SCNC: 5.5 MMOL/L (ref 3.5–5.2)
RBC # BLD AUTO: 3.77 10*6/MM3 (ref 4.14–5.8)
SODIUM BLD-SCNC: 134 MMOL/L (ref 136–145)
WBC NRBC COR # BLD: 15.79 10*3/MM3 (ref 3.4–10.8)

## 2019-12-03 PROCEDURE — 85025 COMPLETE CBC W/AUTO DIFF WBC: CPT | Performed by: ORTHOPAEDIC SURGERY

## 2019-12-03 PROCEDURE — 80048 BASIC METABOLIC PNL TOTAL CA: CPT | Performed by: ORTHOPAEDIC SURGERY

## 2019-12-03 PROCEDURE — G0108 DIAB MANAGE TRN  PER INDIV: HCPCS

## 2019-12-03 PROCEDURE — 97162 PT EVAL MOD COMPLEX 30 MIN: CPT | Performed by: PHYSICAL THERAPIST

## 2019-12-03 PROCEDURE — 97535 SELF CARE MNGMENT TRAINING: CPT | Performed by: OCCUPATIONAL THERAPIST

## 2019-12-03 PROCEDURE — 99239 HOSP IP/OBS DSCHRG MGMT >30: CPT | Performed by: NURSE PRACTITIONER

## 2019-12-03 PROCEDURE — 82962 GLUCOSE BLOOD TEST: CPT

## 2019-12-03 PROCEDURE — 97110 THERAPEUTIC EXERCISES: CPT | Performed by: OCCUPATIONAL THERAPIST

## 2019-12-03 PROCEDURE — 97116 GAIT TRAINING THERAPY: CPT | Performed by: PHYSICAL THERAPIST

## 2019-12-03 RX ORDER — OXYCODONE HYDROCHLORIDE 5 MG/1
5 TABLET ORAL EVERY 4 HOURS PRN
Qty: 30 TABLET | Refills: 0 | Status: SHIPPED | OUTPATIENT
Start: 2019-12-03 | End: 2019-12-12

## 2019-12-03 RX ADMIN — METFORMIN HYDROCHLORIDE 1000 MG: 500 TABLET, EXTENDED RELEASE ORAL at 08:26

## 2019-12-03 RX ADMIN — INSULIN LISPRO 3 UNITS: 100 INJECTION, SOLUTION INTRAVENOUS; SUBCUTANEOUS at 11:43

## 2019-12-03 RX ADMIN — PANTOPRAZOLE SODIUM 40 MG: 40 TABLET, DELAYED RELEASE ORAL at 07:13

## 2019-12-03 RX ADMIN — LOSARTAN POTASSIUM 50 MG: 50 TABLET ORAL at 08:26

## 2019-12-03 RX ADMIN — ALLOPURINOL 200 MG: 100 TABLET ORAL at 08:26

## 2019-12-03 RX ADMIN — OXYBUTYNIN CHLORIDE 5 MG: 5 TABLET, EXTENDED RELEASE ORAL at 08:25

## 2019-12-03 RX ADMIN — INSULIN LISPRO 2 UNITS: 100 INJECTION, SOLUTION INTRAVENOUS; SUBCUTANEOUS at 08:25

## 2019-12-03 NOTE — PROGRESS NOTES
Discharge Planning Assessment  [facility]     Patient Name: Nhi Hager  MRN: [id number]  Today's Date: 12/3/2019    Admit Date: 12/2/2019    Discharge Needs Assessment     Row Name 12/03/19 125       Living Environment    Lives With  spouse    Name(s) of Who Lives With Patient  Wife: La    Current Living Arrangements  home/apartment/condo    Family Caregiver if Needed  spouse    Quality of Family Relationships  involved;supportive;helpful    Able to Return to Prior Arrangements  yes    Living Arrangement Comments  Mr. Hankins lives with his wife in a 2 story home, Bedroom and bathroom on the first floor, in [facility].       Resource/Environmental Concerns    Resource/Environmental Concerns  home accessibility    Home Accessibility Concerns  stairs to enter home    Transportation Concerns  car, none       Transition Planning    Patient/Family Anticipates Transition to  home with family    Patient/Family Anticipated Services at Transition  none    Transportation Anticipated  family or friend will provide       Discharge Needs Assessment    Readmission Within the Last 30 Days  no previous admission in last 30 days    Concerns to be Addressed  no discharge needs identified    Equipment Currently Used at Home  cane, straight    Equipment Needed After Discharge  none        Discharge Plan     Row Name 12/03/19 2669       Plan    Plan  Home with wife's assistance    Patient/Family in Agreement with Plan  yes    Plan Comments  Met with Mr. Hager and his wife, La, at the bedside for discharge planning.  Mr. Hager was evaluated by OT and did not pass his mobility screen.  PT will be evaluting the patient this afternoon.  Mr. Hankins denies any DME or home health needs.  Discussed [facility] visit and he denies needing [facility].  He stated that when he has a follow up with Dr. Miranda, he will discuss with him, if he need PT or OT at that time.  The patient stated that his wife will be assisting him at home  as needed and will be transporting him home at discharge.    CM will continue to follow.    Final Discharge Disposition Code  01 - home or self-care        Destination      No service coordination in this encounter.      Durable Medical Equipment      No service coordination in this encounter.      Dialysis/Infusion      No service coordination in this encounter.      Home Medical Care      No service coordination in this encounter.      Therapy      No service coordination in this encounter.      Community Resources      No service coordination in this encounter.        Expected Discharge Date and Time     Expected Discharge Date Expected Discharge Time    Dec 3, 2019         Demographic Summary     Row Name 12/03/19 1247       General Information    Admission Type  inpatient    Arrived From  home    Reason for Consult  discharge planning    General Information Comments  PCP:  Denis Tamez       Contact Information    Permission Granted to Share Info With      Contact Information Comments  Wife:  La, ph [phone]        Functional Status     Row Name 12/03/19 1248       Functional Status    Usual Activity Tolerance  good    Current Activity Tolerance  -- Following for PT notes.       Functional Status, IADL    Medications  independent    Meal Preparation  independent    Housekeeping  independent    Laundry  independent    Shopping  independent       Mental Status    General Appearance WDL  WDL        Psychosocial    No documentation.       Abuse/Neglect    No documentation.       Legal     Row Name 12/03/19 1250       Financial/Legal    Finance Comments  Mr. Hager has prescription drug coverage with [facility].        Substance Abuse    No documentation.       Patient Forms    No documentation.           Marybeth Nieto, RN

## 2019-12-03 NOTE — PLAN OF CARE
Problem: Patient Care Overview  Goal: Plan of Care Review  Outcome: Ongoing (interventions implemented as appropriate)   12/03/19 0413   Coping/Psychosocial   Plan of Care Reviewed With patient   Plan of Care Review   Progress improving   OTHER   Outcome Summary Pt VSS and AOx4. Pt has not complained of pain. Pt has been voiding spontaneously in the urinal. Arrow at 6mL/hr.        Problem: Fall Risk (Adult)  Goal: Identify Related Risk Factors and Signs and Symptoms  Outcome: Ongoing (interventions implemented as appropriate)    Goal: Absence of Fall  Outcome: Ongoing (interventions implemented as appropriate)      Problem: Shoulder Arthroplasty (Adult)  Goal: Signs and Symptoms of Listed Potential Problems Will be Absent, Minimized or Managed (Shoulder Arthroplasty)  Outcome: Ongoing (interventions implemented as appropriate)    Goal: Anesthesia/Sedation Recovery  Outcome: Ongoing (interventions implemented as appropriate)

## 2019-12-03 NOTE — THERAPY EVALUATION
Acute Care - Occupational Therapy Initial Evaluation  [facility]     Patient Name: Nhi Hager  : 1945  MRN: [id number]  Today's Date: 2019  Onset of Illness/Injury or Date of Surgery: 19  Date of Referral to OT: 19  Referring Physician: Dr. Miranda    Admit Date: 2019       ICD-10-CM ICD-9-CM   1. Impaired mobility and ADLs Z74.09 799.89     Patient Active Problem List   Diagnosis   • Status post reverse arthroplasty of right shoulder   • Essential hypertension   • Hyperlipidemia   • H/O non-insulin dependent diabetes mellitus   • Gout   • GERD without esophagitis     Past Medical History:   Diagnosis Date   • Arthritis    • Balance problem     vertigo   • Decreased lung capacity    • Diabetes mellitus (CMS/HCC)     checks sugar once per week    • Hepatitis A    • History of transfusion    • Hypertension    • Vertigo     h/o   • Wears glasses      Past Surgical History:   Procedure Laterality Date   • ACHILLES TENDON SURGERY      right    • CATARACT EXTRACTION      bilat    • COLONOSCOPY     • CORONARY ARTERY BYPASS GRAFT      4 vessles    • HERNIA REPAIR      bilat    • JOINT REPLACEMENT      bila hip    • SHOULDER ARTHROTOMY      bilat    • SINUS SURGERY      just cleaned out    • TONSILLECTOMY     • WISDOM TOOTH EXTRACTION            OT ASSESSMENT FLOWSHEET (last 12 hours)      Occupational Therapy Evaluation     Row Name 19 1444                   OT Evaluation Time/Intention    Subjective Information  complains of;fatigue  -AR        Document Type  evaluation  -AR        Patient Effort  excellent  -AR        Symptoms Noted During/After Treatment  none  -AR           General Information    Patient Profile Reviewed?  yes  -AR        Onset of Illness/Injury or Date of Surgery  19  -AR        Referring Physician  Dr. Miranda  -AR        Patient Observations  alert;cooperative;agree to therapy  -AR        Patient/Family Observations  pt supine, spouse at bedside  -AR         Prior Level of Function  independent:;all household mobility;community mobility;gait;transfer;min assist:;ADL's;home management baseline balance issues, no recent falls  -AR        Equipment Currently Used at Home  commode, bedside;shower chair  -AR        Pertinent History of Current Functional Problem  Pt is a 74 yom admitted for surgical management of progressive right shoulder pain and dysfunction that failed to improve with conservative measures. Pt is POD#0 right RTSA and right biceps tenodesis.   -AR        Existing Precautions/Restrictions  fall;right;shoulder;non-weight bearing;other (see comments) interscalene, [place]   -AR        Risks Reviewed  patient and family:;LOB;nausea/vomiting;dizziness;increased discomfort;change in vital signs;increased drainage;lines disloged  -AR        Benefits Reviewed  patient and family:;improve function;increase independence;increase balance;increase strength;decrease pain;decrease risk of DVT;increase knowledge  -AR        Barriers to Rehab  none identified  -AR           Relationship/Environment    Primary Source of Support/Comfort  spouse  -AR        Lives With  spouse  -AR           Resource/Environmental Concerns    Current Living Arrangements  home/apartment/condo  -AR        Resource/Environmental Concerns  home accessibility  -AR        Home Accessibility Concerns  stairs to enter home  -AR           Home Main Entrance    Number of Stairs, Main Entrance  three  -AR        Stair Railings, Main Entrance  none  -AR           Cognitive Assessment/Interventions    Additional Documentation  Cognitive Assessment/Intervention (Group)  -AR           Cognitive Assessment/Intervention- PT/OT    Affect/Mental Status (Cognitive)  WNL  -AR        Orientation Status (Cognition)  oriented x 4  -AR        Follows Commands (Cognition)  WNL  -AR        Cognitive Function (Cognitive)  WNL  -AR           Mobility Assessment/Treatment    Extremity Weight-bearing Status  right upper  extremity  -AR        Right Upper Extremity (Weight-bearing Status)  non weight-bearing (NWB)  -AR           Bed Mobility Assessment/Treatment    Bed Mobility Assessment/Treatment  scooting/bridging;supine-sit;sit-supine  -AR        Scooting/Bridging [place] (Bed Mobility)  supervision;verbal cues  -AR        Supine-Sit [place] (Bed Mobility)  supervision;verbal cues  -AR        Bed Mobility, Safety Issues  decreased use of arms for pushing/pulling  -AR        Assistive Device (Bed Mobility)  head of bed elevated  -AR        Comment (Bed Mobility)  Educated pt and spouse on importance of maintaining NWB RUE, reviewed safe sleeping position  -AR           Functional Mobility    Functional Mobility- Ind. Level  contact guard assist  -AR        Functional Mobility-Distance (Feet)  300  -AR        Functional Mobility- Comment  Pt had no issues with dyspnea or desaturation while ambulating on room air. Pt scored 17 on mobility screen, recommend PT eval and pt in agreement.   -AR           Transfer Assessment/Treatment    Transfer Assessment/Treatment  sit-stand transfer;stand-sit transfer;toilet transfer  -AR        Maintains Weight-bearing Status (Transfers)  able to maintain  -AR        Comment (Transfers)  Mild retrograde LOB episodes noted in static standing. Cues to attend to location of ARROW pump  -AR           Sit-Stand Transfer    Sit-Stand [place] (Transfers)  contact guard  -AR           Stand-Sit Transfer    Stand-Sit [place] (Transfers)  contact guard  -AR           Toilet Transfer    Type (Toilet Transfer)  lateral  -AR        [place] Level (Toilet Transfer)  contact guard  -AR           ADL Assessment/Intervention    22581 - OT Self Care/Mgmt Minutes  29  -AR        BADL Assessment/Intervention  bathing;upper body dressing;lower body dressing;feeding;toileting  -AR           Bathing Assessment/Intervention    Comment (Bathing)  Reviewed right shoulder precautions, right axilla care  to maintain and that pt may not shower until interscalene has been DC  -AR           Upper Body Dressing Assessment/Training    Upper Body Dressing [place] Level  doff;don;pajama/robe;dependent (less than 25% patient effort) RUE sling  -AR        Assistive Devices (Upper Body Dressing)  one hand technique  -AR        Upper Body Dressing Position  supine;edge of bed sitting  -AR        Comment (Upper Body Dressing)  Educated pt and spouse on right shoulder precautions, ADL retraining to maintain, sling management and care of interscalene nerve catheter during ADLs to avoid dislodgement. Spouse able to don sling with mod assist.   -AR           Lower Body Dressing Assessment/Training    Lower Body Dressing [place] Level  don;socks;maximum assist (25% patient effort)  -AR        Lower Body Dressing Position  edge of bed sitting  -AR           Self-Feeding Assessment/Training    [place] Level (Feeding)  liquids to mouth;supervision  -AR        Position (Self-Feeding)  supine  -AR           Toileting Assessment/Training    [place] Level (Toileting)  toileting skills;contact guard assist  -AR        Toileting Position  supported sitting;supported standing  -AR           General ROM    GENERAL ROM COMMENTS  LUE grossly WFL, R elbow/wrist/hand WFL  -AR           MMT (Manual Muscle Testing)    General MMT Comments  LUE grossly WFL, RUE deferred  -AR           Motor Assessment/Interventions    Additional Documentation  Balance (Group);Balance Interventions (Group);Fine Motor Testing & Training (Group);Therapeutic Exercise (Group);Therapeutic Exercise Interventions (Group)  -AR           Therapeutic Exercise    64123 - OT Therapeutic Exercise Minutes  24  -AR        68129 - OT Therapeutic Activity Minutes  12  -AR           Therapeutic Exercise    Upper Extremity Range of Motion (Therapeutic Exercise)  shoulder flexion/extension, right;shoulder internal/external rotation, right;elbow flexion/extension,  right;forearm supination/pronation, right;wrist flexion/extension, right scapular retractions- bilat  -AR        Hand (Therapeutic Exercise)  finger flexion/extension, right  -AR        Position (Therapeutic Exercise)  supine  -AR        Sets/Reps (Therapeutic Exercise)  1/10  -AR        Comment (Therapeutic Exercise)  Issued and reviewed RUE HEP. Pt tolerated R shoulder PROM , IR chest/ER 30 with good teachback from spouse on IR/ER. She declined trial of FE this PM.   -AR           Balance    Balance  static sitting balance;static standing balance  -AR           Static Sitting Balance    Level of [place] (Unsupported Sitting, Static Balance)  supervision  -AR        Sitting Position (Unsupported Sitting, Static Balance)  sitting on edge of bed  -AR        Time Able to Maintain Position (Unsupported Sitting, Static Balance)  more than 5 minutes  -AR           Static Standing Balance    Level of [place] (Supported Standing, Static Balance)  contact guard assist  -AR        Time Able to Maintain Position (Supported Standing, Static Balance)  15 to 30 seconds  -AR           Fine Motor Testing & Training    Comment, Fine Motor Coordination  unable to oppose R hand  -AR           Sensory Assessment/Intervention    Sensory General Assessment  light touch sensation deficits identified  -AR           Light Touch Sensation Assessment    Left Upper Extremity: Light Touch Sensation Assessment  intact  -AR        Right Upper Extremity: Light Touch Sensation Assessment  moderate impairment, 50 to 74% correct responses  -AR           Positioning and Restraints    Pre-Treatment Position  in bed  -AR        Post Treatment Position  bed  -AR        In Bed  notified nsg;supine;call light within reach;encouraged to call for assist;exit alarm on;with family/caregiver;with brace;SCD pump applied  -AR           Pain Assessment    Additional Documentation  Pain Scale: Numbers Pre/Post-Treatment (Group)  -AR           Pain  Scale: Numbers Pre/Post-Treatment    Pain Scale: Numbers, Pretreatment  0/10 - no pain  -AR        Pain Scale: Numbers, Post-Treatment  0/10 - no pain  -AR           Orthotics & Prosthetics Management    Orthosis Location  upper extremity orthosis  -AR        Additional Documentation  Orthosis Location (Row)  -AR           Upper Extremity Orthosis Management    Type (Upper Extremity Orthosis)  [place] Ultra II sling  -AR        Functional Design (Upper Extremity Orthosis)  static orthosis  -AR        Therapeutic Indications (Upper Extremity Orthosis)  post-op positioning/protection;stabilization and support  -AR        Wearing Schedule (Upper Extremity Orthosis)  remove for exercise;remove for hygiene/bathing  -AR        Orthosis Training (Upper Extremity Orthosis)  patient and caregiver;all orthosis skills;activity limitations/precautions;cleaning/care of orthosis;donning/doffing orthosis;orthosis adjustment;orthosis maintenance;purpose/goals of orthosis;sensory precautions;skin inspection/precautions;sleeping precautions;wearing schedule;requires cues;requires assistance  -AR        Compliance/Wearing Issues (Upper Extremity Orthosis)  patient/caregiver comprehend strategies;follow-up training required  -AR           Wound 12/02/19 0827 Right shoulder Incision    Wound - Properties Group Date first assessed: 12/02/19  -JA Time first assessed: 0827  -JA Side: Right  - Location: shoulder  -JA Primary Wound Type: Incision  -JA       Plan of Care Review    Plan of Care Reviewed With  patient;spouse  -AR           Clinical Impression (OT)    Date of Referral to OT  12/02/19  -AR        OT Diagnosis  decreased independence with ADLS  -AR        Patient/Family Goals Statement (OT Eval)  return home  -AR        Criteria for Skilled Therapeutic Interventions Met (OT Eval)  yes;treatment indicated  -AR        Rehab Potential (OT Eval)  good, to achieve stated therapy goals  -AR        Therapy Frequency (OT Eval)  daily   -AR        Anticipated Discharge Disposition (OT)  home with assist  -AR           Vital Signs    Pre SpO2 (%)  94  -AR        O2 Delivery Pre Treatment  room air  -AR        Intra SpO2 (%)  97  -AR        O2 Delivery Intra Treatment  room air  -AR        Post SpO2 (%)  95  -AR        O2 Delivery Post Treatment  room air  -AR           OT Goals    Bed Mobility Goal Selection (OT)  bed mobility, OT goal 1  -AR        Transfer Goal Selection (OT)  transfer, OT goal 1  -AR        Dressing Goal Selection (OT)  dressing, OT goal 1  -AR        ROM Goal Selection (OT)  ROM, OT goal 1  -AR        Problem Specific Goal Selection (OT)  problem specific goal 1, OT  -AR        Additional Documentation  Problem Specific Goal Selection (OT) (Row);ROM Goal Selection (OT) (Row)  -AR           Bed Mobility Goal 1 (OT)    Activity/Assistive Device (Bed Mobility Goal 1, OT)  supine to sit while maintaining NWB RUE  -AR        [place] Level/Cues Needed (Bed Mobility Goal 1, OT)  verbal cues required;contact guard assist  -AR        Time Frame (Bed Mobility Goal 1, OT)  short term goal (STG);3 days  -AR        Progress/Outcomes (Bed Mobility Goal 1, OT)  goal met  -AR           Transfer Goal 1 (OT)    Activity/Assistive Device (Transfer Goal 1, OT)  sit-to-stand/stand-to-sit;toilet  -AR        [place] Level/Cues Needed (Transfer Goal 1, OT)  verbal cues required;contact guard assist  -AR        Time Frame (Transfer Goal 1, OT)  short term goal (STG);3 days  -AR        Progress/Outcome (Transfer Goal 1, OT)  goal met  -AR           Dressing Goal 1 (OT)    Activity/Assistive Device (Dressing Goal 1, OT)  other (see comments) Pt and spouse will don/doff RUE sling  -AR        [place]/Cues Needed (Dressing Goal 1, OT)  supervision required;verbal cues required  -AR        Time Frame (Dressing Goal 1, OT)  short term goal (STG);3 days  -AR        Progress/Outcome (Dressing Goal 1, OT)  goal ongoing  -AR           ROM Goal 1  (OT)    ROM Goal 1 (OT)  Pt and spouse will complete RUE HEP within physician parameters with supervision  -AR        Time Frame (ROM Goal 1, OT)  short term goal (STG);3 days  -AR        Progress/Outcome (ROM Goal 1, OT)  goal ongoing  -AR           Problem Specific Goal 1 (OT)    Problem Specific Goal 1 (OT)  During ADLs, pt/spouse will verbalize/demo care of interscalene nerve catheter to avoid dislodgement and will maintain shoulder precautions with min cues  -AR        Time Frame (Problem Specific Goal 1, OT)  short term goal (STG);3 days  -AR        Progress/Outcome (Problem Specific Goal 1, OT)  goal ongoing  -AR           Living Environment    Home Accessibility  stairs to enter home  -AR          User Key  (r) = Recorded By, (t) = Taken By, (c) = Cosigned By    Initials Name Effective Dates    Mary Carmen Pittman, OT 06/22/15 -     Gurvinder Delarosa RN 06/16/16 -          Occupational Therapy Education     Title: PT OT SLP Therapies (Done)     Topic: Occupational Therapy (Done)     Point: ADL training (Done)     Description: Instruct learner(s) on proper safety adaptation and remediation techniques during self care or transfers.   Instruct in proper use of assistive devices.    Learning Progress Summary           Patient Eager, E,TB,D,H, VU,NR by AR at 12/2/2019  2:44 PM   Family Eager, E,TB,D,H, VU,NR by AR at 12/2/2019  2:44 PM                   Point: Home exercise program (Done)     Description: Instruct learner(s) on appropriate technique for monitoring, assisting and/or progressing therapeutic exercises/activities.    Learning Progress Summary           Patient Eager, E,TB,D,H, VU,NR by AR at 12/2/2019  2:44 PM   Family Eager, E,TB,D,H, VU,NR by AR at 12/2/2019  2:44 PM                   Point: Precautions (Done)     Description: Instruct learner(s) on prescribed precautions during self-care and functional transfers.    Learning Progress Summary           Patient Eager, E,TB,D,H, VU,NR by AR  at 12/2/2019  2:44 PM   Family Jermaine, MAGUE,TB,D,H, VU,NR by AR at 12/2/2019  2:44 PM                   Point: Body mechanics (Done)     Description: Instruct learner(s) on proper positioning and spine alignment during self-care, functional mobility activities and/or exercises.    Learning Progress Summary           Patient Jermaine, E,TB,D,H, VU,NR by AR at 12/2/2019  2:44 PM   Family Jeffryer, E,TB,D,H, VU,NR by AR at 12/2/2019  2:44 PM                               User Key     Initials Effective Dates Name Provider Type Discipline    AR 06/22/15 -  Mary Carmen Stover, OT Occupational Therapist OT                  OT Recommendation and Plan  Outcome Summary/Treatment Plan (OT)  Anticipated Discharge Disposition (OT): home with assist  Therapy Frequency (OT Eval): daily  Plan of Care Review  Plan of Care Reviewed With: patient, spouse  Plan of Care Reviewed With: patient, spouse  Outcome Summary: Interscalene infusing at 6, no c/o pain. Pt tolerated R shoulder PROM , IR chest/ER 30 with good teachback from spouse on IR/ER. She requested trial FE at next visit. Pt reports mild balance issues at baseline and did not pass mobility screen, recommend PT eval. Recommend DC home with spouse assist following completion of training at next visit.     Outcome Measures     Row Name 12/02/19 1444             How much help from another is currently needed...    Putting on and taking off regular lower body clothing?  2  -AR      Bathing (including washing, rinsing, and drying)  2  -AR      Toileting (which includes using toilet bed pan or urinal)  3  -AR      Putting on and taking off regular upper body clothing  2  -AR      Taking care of personal grooming (such as brushing teeth)  3  -AR      Eating meals  3  -AR      AM-PAC 6 Clicks Score (OT)  15  -AR         Functional Assessment    Outcome Measure Options  AM-PAC 6 Clicks Daily Activity (OT)  -AR        User Key  (r) = Recorded By, (t) = Taken By, (c) = Cosigned By     Initials Name Provider Type    AR Mary Carmen Stover OT Occupational Therapist          Time Calculation:   Time Calculation- OT     Row Name 12/02/19 1444             Time Calculation- OT    OT Start Time  1444  -AR      Total Timed Code Minutes- OT  65 minute(s)  -AR      OT Received On  12/02/19  -AR      OT Goal Re-Cert Due Date  12/12/19  -AR         Timed Charges    10366 - OT Therapeutic Exercise Minutes  24  -AR      88895 - OT Therapeutic Activity Minutes  12  -AR      51200 - OT Self Care/Mgmt Minutes  29  -AR        User Key  (r) = Recorded By, (t) = Taken By, (c) = Cosigned By    Initials Name Provider Type    AR Mary Carmen Stover OT Occupational Therapist        Therapy Charges for Today     Code Description Service Date Service Provider Modifiers Qty    01046166551 HC OT EVAL MOD COMPLEXITY 4 12/2/2019 Mary Carmen Stover OT GO 1    11488850165 HC OT THER PROC EA 15 MIN 12/2/2019 Mary Carmen Stover OT GO 1    34371821573 HC OT THERAPEUTIC ACT EA 15 MIN 12/2/2019 Mary Carmen Stover OT GO 1    91504406947 HC OT SELF CARE/MGMT/TRAIN EA 15 MIN 12/2/2019 Mary Carmen Stover OT GO 2    27218744116 HC OT THER SUPP EA 15 MIN 12/2/2019 Mary Carmen Stover OT GO 3               Mary Carmen Stover OT  12/2/2019

## 2019-12-03 NOTE — PLAN OF CARE
Problem: Patient Care Overview  Goal: Plan of Care Review  Outcome: Outcome(s) achieved Date Met: 12/03/19 12/03/19 6170   Coping/Psychosocial   Plan of Care Reviewed With patient;spouse   Plan of Care Review   Progress improving   OTHER   Outcome Summary Interscalene infusing at 6, post pain 2/10 right anterior shoulder. Pt demo R shoulder PROM , IR chest/ER 30 with good teachback from spouse. Recommend DC home with family assist.

## 2019-12-03 NOTE — THERAPY EVALUATION
Patient Name: Nhi Hager  : 1945    MRN: [id number]                              Today's Date: 12/3/2019       Admit Date: 2019    Visit Dx:     ICD-10-CM ICD-9-CM   1. Impaired mobility and ADLs Z74.09 799.89     Patient Active Problem List   Diagnosis   • Status post reverse arthroplasty of right shoulder   • Essential hypertension   • Hyperlipidemia   • H/O non-insulin dependent diabetes mellitus   • Gout   • GERD without esophagitis     Past Medical History:   Diagnosis Date   • Arthritis    • Balance problem     vertigo   • Decreased lung capacity    • Diabetes mellitus (CMS/HCC)     checks sugar once per week    • Hepatitis A    • History of transfusion    • Hypertension    • Vertigo     h/o   • Wears glasses      Past Surgical History:   Procedure Laterality Date   • ACHILLES TENDON SURGERY      right    • CATARACT EXTRACTION      bilat    • COLONOSCOPY     • CORONARY ARTERY BYPASS GRAFT      4 vessles    • HERNIA REPAIR      bilat    • JOINT REPLACEMENT      bila hip    • SHOULDER ARTHROTOMY      bilat    • SINUS SURGERY      just cleaned out    • TONSILLECTOMY     • TOTAL SHOULDER ARTHROPLASTY W/ DISTAL CLAVICLE EXCISION Right 2019    Procedure: REVERSE TOTAL SHOULDER ARTHROPLASTY RIGHT;  Surgeon: Sundar Miranda MD;  Location: [facility];  Service: Orthopedics   • WISDOM TOOTH EXTRACTION       General Information     Row Name 19 0935          PT Evaluation Time/Intention    Document Type  evaluation;discharge evaluation/summary  -CS     Mode of Treatment  physical therapy  -CS     Row Name 19 0935          General Information    Patient Profile Reviewed?  yes  -CS     Prior Level of Function  min assist:;dressing;home management;bathing;cooking;cleaning increase in R shoulder pain  -CS     Existing Precautions/Restrictions  fall;right;shoulder;non-weight bearing;other (see comments) interscalene catheter, DonJoy sling  -CS     Barriers to Rehab  none identified  -CS      Row Name 12/03/19 0935          Relationship/Environment    Lives With  spouse  -CS     Row Name 12/03/19 0935          Resource/Environmental Concerns    Current Living Arrangements  home/apartment/condo  -CS     Row Name 12/03/19 0935          Home Main Entrance    Number of Stairs, Main Entrance  two  -CS     Stair Railings, Main Entrance  none  -CS     Row Name 12/03/19 0935          Stairs Within Home, Primary    Stairs, Within Home, Primary  2 story home but bed and bath on first floor  -CS     Number of Stairs, Within Home, Primary  none  -CS     Row Name 12/03/19 0935          Cognitive Assessment/Intervention- PT/OT    Orientation Status (Cognition)  oriented x 4  -CS     Row Name 12/03/19 0935          Safety Issues, Functional Mobility    Safety Issues Affecting Function (Mobility)  safety precautions follow-through/compliance;safety precaution awareness  -CS     Impairments Affecting Function (Mobility)  endurance/activity tolerance;strength  -CS       User Key  (r) = Recorded By, (t) = Taken By, (c) = Cosigned By    Initials Name Provider Type    CS Crys Trevizo, PT Physical Therapist        Mobility     Row Name 12/03/19 0935          Bed Mobility Assessment/Treatment    Bed Mobility Assessment/Treatment  supine-sit  -CS     Supine-Sit [place] (Bed Mobility)  supervision;verbal cues  -CS     Assistive Device (Bed Mobility)  head of bed elevated  -CS     Comment (Bed Mobility)  Educated pt on maintain NWB on R UE for bed mobility.   -CS     Row Name 12/03/19 0935          Transfer Assessment/Treatment    Comment (Transfers)  VC's to push off of bed with L UE to stand and to reach back with L UE with sitting. VC's to attend to ARROW pump.  -CS     Row Name 12/03/19 0935          Sit-Stand Transfer    Sit-Stand [place] (Transfers)  supervision  -CS     Assistive Device (Sit-Stand Transfers)  other (see comments) no AD  -CS     Row Name 12/03/19 0935          Gait/Stairs  Assessment/Training    38070 - Gait Training Minutes   15  -CS     Gait/Stairs Assessment/Training  gait/ambulation independence;gait/ambulation assistive device;distance ambulated;gait pattern  -CS     [place] Level (Gait)  independent;verbal cues  -CS     Assistive Device (Gait)  other (see comments) no AD  -CS     Distance in Feet (Gait)  400'  -CS     Pattern (Gait)  step-through  -CS     Negotiation (Stairs)  stairs assistive device;stairs independence;handrail location;number of steps;ascending technique;descending technique  -CS     [place] Level (Stairs)  supervision  -CS     Assistive Device (Stairs)  other (see comments) no AD  -CS     Handrail Location (Stairs)  none  -CS     Ascending Technique (Stairs)  step-over-step  -CS     Descending Technique (Stairs)  step-over-step  -CS     Comment (Gait/Stairs)  Pt amb 400' with step through gait mechanics indep with no AD. VC's to attend to R UE in sling to ensure it does not bump into wall with amb. Pt had no lossess of balance, pt does report prior to surgery he had balance issues. Pt able to ascend/descend  3 steps with no handrails and no AD with step over pattern with supervision.   -CS     Row Name 12/03/19 0935          Mobility Assessment/Intervention    Extremity Weight-bearing Status  right upper extremity  -CS     Right Upper Extremity (Weight-bearing Status)  non weight-bearing (NWB)  -CS       User Key  (r) = Recorded By, (t) = Taken By, (c) = Cosigned By    Initials Name Provider Type    CS Crys Trevizo, PT Physical Therapist        Obj/Interventions     Row Name 12/03/19 0935          General ROM    GENERAL ROM COMMENTS  B LE WFL  -CS     Row Name 12/03/19 0935          MMT (Manual Muscle Testing)    General MMT Comments  B LE MMT grossly 4+/5  -CS     Row Name 12/03/19 0935          Static Sitting Balance    Level of [place] (Unsupported Sitting, Static Balance)  independent  -CS     Sitting Position (Unsupported Sitting,  Static Balance)  sitting on edge of bed;sitting in chair  -     Time Able to Maintain Position (Unsupported Sitting, Static Balance)  more than 5 minutes  -     Row Name 12/03/19 0935          Static Standing Balance    Level of [place] (Supported Standing, Static Balance)  independent  -CS     Time Able to Maintain Position (Supported Standing, Static Balance)  more than 5 minutes  -     Row Name 12/03/19 0935          Dynamic Standing Balance    Level of [place], Reaches Outside Midline (Standing, Dynamic Balance)  supervision  -CS     Time Able to Maintain Position, Reaches Outside Midline (Standing, Dynamic Balance)  1 to 2 minutes  -     Row Name 12/03/19 0935          Sensory Assessment/Intervention    Sensory General Assessment  -- light touch on R UE deficits identified. No sensation deficits in B LE  -CS       User Key  (r) = Recorded By, (t) = Taken By, (c) = Cosigned By    Initials Name Provider Type    CS Crys Trevizo, PT Physical Therapist        Goals/Plan    No documentation.       Clinical Impression     [facility] Name 12/03/19 0935          Pain Assessment    Additional Documentation  Pain Scale: Numbers Pre/Post-Treatment (Group)  -[facility] Name 12/03/19 0935          Pain Scale: Numbers Pre/Post-Treatment    Pain Scale: Numbers, Pretreatment  0/10 - no pain  -CS     Pain Scale: Numbers, Post-Treatment  0/10 - no pain  -CS     Pre/Post Treatment Pain Comment  Pt reports no pain pre and post treatment   -     Pain Intervention(s)  Ambulation/increased activity;Repositioned  -     Row Name 12/03/19 0935          Plan of Care Review    Plan of Care Reviewed With  patient  -[facility] Name 12/03/19 0935          Physical Therapy Clinical Impression    Patient/Family Goals Statement (PT Clinical Impression)  To go home  -CS     Criteria for Skilled Interventions Met (PT Clinical Impression)  current level of function same as previous level of function;no;other (see comments) Pt  being discharged home today.  -CS     Row Name 12/03/19 0935          Vital Signs    Pre SpO2 (%)  95  -CS     O2 Delivery Pre Treatment  room air  -CS     Intra SpO2 (%)  98  -CS     O2 Delivery Intra Treatment  room air  -CS     Post SpO2 (%)  97  -CS     O2 Delivery Post Treatment  room air  -CS     Row Name 12/03/19 0935          Positioning and Restraints    Pre-Treatment Position  in bed  -CS     Post Treatment Position  chair  -CS     In Chair  reclined;call light within reach;encouraged to call for assist;legs elevated Pt refused exit alarm  -CS       User Key  (r) = Recorded By, (t) = Taken By, (c) = Cosigned By    Initials Name Provider Type    Crys Yeh PT Physical Therapist        Outcome Measures     Row Name 12/03/19 0935          How much help from another person do you currently need...    Turning from your back to your side while in flat bed without using bedrails?  3  -CS     Moving from lying on back to sitting on the side of a flat bed without bedrails?  3  -CS     Moving to and from a bed to a chair (including a wheelchair)?  4  -CS     Standing up from a chair using your arms (e.g., wheelchair, bedside chair)?  4  -CS     Climbing 3-5 steps with a railing?  4  -CS     To walk in hospital room?  4  -CS     AM-PAC 6 Clicks Score (PT)  22  -CS     Row Name 12/03/19 0935          Functional Assessment    Outcome Measure Options  AM-PAC 6 Clicks Basic Mobility (PT)  -CS       User Key  (r) = Recorded By, (t) = Taken By, (c) = Cosigned By    Initials Name Provider Type    Crys Yeh PT Physical Therapist        Physical Therapy Education     Title: PT OT SLP Therapies (Done)     Topic: Physical Therapy (Done)     Point: Mobility training (Done)     Learning Progress Summary           Patient Acceptance, SUDHA BOWSER, BIBI SLOAN by PHAM at 12/3/2019  9:35 AM    Comment:  Educated patient on WB status on R UE, sequencing with steps with no HR, attending to ARROW when standing, attending to R  UE with walls during ambulation and turns.                   Point: Home exercise program (Done)     Learning Progress Summary           Patient Acceptance, E,D, VU,DU by  at 12/3/2019  9:35 AM    Comment:  Educated patient on WB status on R UE, sequencing with steps with no HR, attending to ARROW when standing, attending to R UE with walls during ambulation and turns.                   Point: Body mechanics (Done)     Learning Progress Summary           Patient Acceptance, E,D, VU,DU by  at 12/3/2019  9:35 AM    Comment:  Educated patient on WB status on R UE, sequencing with steps with no HR, attending to ARROW when standing, attending to R UE with walls during ambulation and turns.                   Point: Precautions (Done)     Learning Progress Summary           Patient Acceptance, E,D, VU,DU by  at 12/3/2019  9:35 AM    Comment:  Educated patient on WB status on R UE, sequencing with steps with no HR, attending to ARROW when standing, attending to R UE with walls during ambulation and turns.                               User Key     Initials Effective Dates Name Provider Type Discipline     03/26/19 -  Crys Trevizo, PT Physical Therapist PT              PT Recommendation and Plan     Outcome Summary/Treatment Plan (PT)  Anticipated Discharge Disposition (PT): home with assist  Plan of Care Reviewed With: patient  Progress: improving  Outcome Summary: Pt amb 400' with step through gait mechanics and no AD indep with VC's to attend to R UE with turns and doorframes. Pt able to ascend/descend 3 steps with no HR's and no AD with supervision. Recommend patient home with assist at discharge.      Time Calculation:   PT Charges     Row Name 12/03/19 0912             Time Calculation    Start Time  0935  -      PT Received On  12/03/19  -         Time Calculation- PT    Total Timed Code Minutes- PT  15 minute(s)  -         Timed Charges    86311 - Gait Training Minutes   15  -        User Key   (r) = Recorded By, (t) = Taken By, (c) = Cosigned By    Initials Name Provider Type    CS Crys Trevizo, PT Physical Therapist        Therapy Charges for Today     Code Description Service Date Service Provider Modifiers Qty    15190795696 HC GAIT TRAINING EA 15 MIN 12/3/2019 Crys Trevizo, PT GP 1    95766985209 HC PT EVAL MOD COMPLEXITY 3 12/3/2019 Crys Trevizo, PT GP 1          PT G-Codes  Outcome Measure Options: AM-PAC 6 Clicks Basic Mobility (PT)  AM-PAC 6 Clicks Score (PT): 22  AM-PAC 6 Clicks Score (OT): 15    Crys Trevizo PT  12/3/2019

## 2019-12-03 NOTE — PLAN OF CARE
Problem: Patient Care Overview  Goal: Plan of Care Review  Outcome: Ongoing (interventions implemented as appropriate)   12/03/19 0851   Coping/Psychosocial   Plan of Care Reviewed With patient   Plan of Care Review   Progress improving   OTHER   Outcome Summary Pt amb 400' with step through gait mechanics and no AD indep with VC's to attend to R UE with turns and doorframes. Pt able to ascend/descend 3 steps with no HR's and no AD with supervision. Recommend patient home with assist at discharge.

## 2019-12-03 NOTE — PLAN OF CARE
Problem: Patient Care Overview  Goal: Plan of Care Review  Outcome: Ongoing (interventions implemented as appropriate)   12/02/19 1444   Coping/Psychosocial   Plan of Care Reviewed With patient;spouse   Plan of Care Review   Progress improving   OTHER   Outcome Summary Interscalene infusing at 6, no c/o pain. Pt tolerated R shoulder PROM , IR chest/ER 30 with good teachback from spouse on IR/ER. She requested trial FE at next visit. Pt reports mild balance issues at baseline and did not pass mobility screen, recommend PT eval. Recommend DC home with spouse assist following completion of training at next visit.        Problem: Shoulder Arthroplasty (Adult)  Goal: Signs and Symptoms of Listed Potential Problems Will be Absent, Minimized or Managed (Shoulder Arthroplasty)  Outcome: Ongoing (interventions implemented as appropriate)   12/02/19 8864   Goal/Outcome Evaluation   Problems Assessed (Shoulder Arthro) functional deficit;pain   Problems Present (Shoulder Arthro) functional deficit;pain

## 2019-12-03 NOTE — CONSULTS
"Diabetes Education  Assessment/Teaching    Patient Name:  Nhi Hager  YOB: 1945  MRN: [id number]  Admit Date:  12/2/2019      Assessment Date:  12/3/2019    Most Recent Value   General Information    Referral From:  A1c   Pregnancy Assessment   Diabetes History   What type of diabetes do you have?  Type 2   Length of Diabetes Diagnosis  1 - 5 years   Current DM knowledge  good   Have you had diabetes education/teaching in the past?  yes   When and where was your diabetes education?  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  yes   Frequency of checks  \"every so often maybe once a week\"   Who performs the test?  self   Have you had low blood sugar? (<70mg/dl)  no   Have you had high blood sugar? (>140mg/dl)  yes   How often do you have high blood sugar?  rare   How would you rate your diabetes control?  fair   Do you have any diabetes complications?  circulation problems   Education Preferences   What areas of diabetes would you like to learn about?  avoiding high blood sugar, avoiding low blood sugar, diabetes complications, medications for diabetes, diet information   Nutrition Information   Assessment Topics   Healthy Eating - Assessment  Needs education   Being Active - Assessment  Needs education   Taking Medication - Assessment  Needs education   Problem Solving - Assessment  Needs education   Reducing Risk - Assessment  Needs education   Healthy Coping - Assessment  Needs education   Monitoring - Assessment  Needs education   DM Goals            Most Recent Value   DM Education Needs   Meter  Has own   Frequency of Testing  AC [fasting glucose]   Blood Glucose Target Range  educated on target glucose ranger per ADA recomendations    Medication  Oral   Problem Solving  Hypoglycemia, Hyperglycemia, Sick days, Signs, Symptoms, Treatment   Physical Activity  Walking   Physical Activity Frequency  Occasionally   Patient Response  Verbalized " "understanding, Needs reinforcement            Other Comments:  Patient consented to diabetes education     Discussed and taught patient about type 2 diabetes self-management, risk factors, and importance of blood glucose control to reduce complications. Target blood glucose readings and A1c goals per ADA were reviewed. Reviewed with patient current A1c 7.1 and discussed its significance. Signs, symptoms, and treatment of hyperglycemia and hypoglycemia were discussed. Lifestyle changes such as physical activity with MD approval and healthy eating were encouraged. Stressed the importance of strict blood sugar control after surgery to prevent complications such as infection and to promote healing of incision. Encouraged pt to monitor blood sugar at home 4  times per day and to call PCP if blood sugar is trending tereza. Encouraged to keep record of blood glucose readings to take to follow up appointment with PCP. Provided patient with copy of [employer] \"What is Diabetes\" handout, \"Blood Glucose Goals\" handout, and \"What is A1c\" handout. Thank you for this consult, should patient needs change please re consult us. Thanks         Electronically signed by:  Santy King RN  12/03/19 9:39 AM  "

## 2019-12-03 NOTE — DISCHARGE SUMMARY
[facility] Medicine Services  DISCHARGE SUMMARY    Patient Name: Nhi Hager  : 1945  MRN: [id number]    Date of Admission: 2019  7:36 AM  Date of Discharge:  12/3/2019  Primary Care Physician: Denis Tamez MD    Consults     Date and Time Order Name Status Description    2019 1333 Inpatient Consult to Hospitalist Completed           Hospital Course     Presenting Problem:   Status post reverse arthroplasty of right shoulder [Z96.611]    Active Hospital Problems    Diagnosis  POA   • **Status post reverse arthroplasty of right shoulder [Z96.611]  Not Applicable   • Essential hypertension [I10]  Unknown   • Hyperlipidemia [E78.5]  Unknown   • H/O non-insulin dependent diabetes mellitus [Z86.39]  Unknown   • Gout [M10.9]  Unknown   • GERD without esophagitis [K21.9]  Unknown      Resolved Hospital Problems   No resolved problems to display.          Hospital Course:  Nhi Hager is a 74 y.o. male with a PMH significant for HTN, HLP, GERD, Gout, T2DM, vertigo and arthritis who presented to [facility] for a scheduled right reverse total shoulder arthroplasty with biceps tenodesis.  Hospital medicine was consulted for medical management in the postoperative period.  He has worked with PT/OT, tolerated his diet and urinated spontaneously.  His pain is well controlled.  He is now medically stable and ready for discharge home.  He will follow up with his PCP at discharge and with Dr. Miranda per his recommendations.        Discharge Follow Up Recommendations for labs/diagnostics:  Follow up with PCP  Follow up with Dr. Miranda    Day of Discharge     HPI:   Resting comfortably in chair with family at bedside.  Feeling well.  Pain controlled.  Wants to go home.     Review of Systems  Gen- No fevers, chills  CV- No chest pain, palpitations  Resp- No cough, dyspnea  GI- No N/V/D, abd pain      Otherwise ROS is negative except as mentioned in the HPI.    Vital Signs:   Temp:   [96.4 °F (35.8 °C)-98.1 °F (36.7 °C)] 98.1 °F (36.7 °C)  Heart Rate:  [79-99] 79  Resp:  [16-18] 18  BP: (130-152)/(73-79) 130/73     Physical Exam:  Constitutional: No acute distress, awake, alert, resting in chair, family at bedside.   HENT: NCAT, mucous membranes moist  Respiratory: Clear to auscultation bilaterally, respiratory effort normal on room air   Cardiovascular: RRR, no murmurs, rubs, or gallops, palpable pedal pulses bilaterally  Gastrointestinal: Positive bowel sounds, soft, nontender, nondistended  Musculoskeletal: No bilateral ankle edema  Psychiatric: Appropriate affect, cooperative  Neurologic: Oriented x 3, strength symmetric in all extremities, Cranial Nerves grossly intact to confrontation, speech clear  Skin: No rashes to exposed skin, right shoulder incision intact.  Right arm in brace.  Wiggles fingers. ON Q pump in place.       Pertinent  and/or Most Recent Results     Results from last 7 days   Lab Units 12/03/19  0502 12/03/19  0501   WBC 10*3/mm3 15.79*  --    HEMOGLOBIN g/dL 12.0*  --    HEMATOCRIT % 37.4*  --    PLATELETS 10*3/mm3 227  --    SODIUM mmol/L  --  134*   POTASSIUM mmol/L  --  5.5*   CHLORIDE mmol/L  --  96*   CO2 mmol/L  --  22.0   BUN mg/dL  --  24*   CREATININE mg/dL  --  1.25   GLUCOSE mg/dL  --  216*   CALCIUM mg/dL  --  9.0           Invalid input(s): PROT, LABALBU        Invalid input(s): TG, LDLCALC, LDLREALC        Brief Urine Lab Results     None          Microbiology Results Abnormal     None          Imaging Results (All)     Procedure Component Value Units Date/Time    XR Shoulder 2+ View Right [[id number]] Collected:  12/02/19 1425     Updated:  12/02/19 1830    Narrative:       EXAMINATION: XR SHOULDER 2+ VW RIGHT- 12/02/2019     INDICATION: postop      COMPARISON: NONE     FINDINGS: 2 views of the right shoulder reveal patient to be status post  total right shoulder arthroplasty. There is no hardware complication. No  malalignment identified of the prosthesis  with postsurgical changes seen  in the soft tissues. Drainage catheter is in place.           Impression:       Patient status post total right shoulder arthroplasty with  postsurgical changes seen in the soft tissues. No hardware complication  or malalignment identified of the prosthesis.           D:  12/02/2019  E:  12/02/2019     This report was finalized on 12/2/2019 6:27 PM by Dr. Sherrie Layton MD.                              Discharge Details        Discharge Medications      New Medications      Instructions Start Date   oxyCODONE 5 MG immediate release tablet  Commonly known as:  ROXICODONE   5 mg, Oral, Every 4 Hours PRN         Continue These Medications      Instructions Start Date   allopurinol 100 MG tablet  Commonly known as:  ZYLOPRIM   100 mg, Oral, 2 Times Daily      Biotin 1000 MCG tablet   1,000 mcg, Oral, Daily      CBD oral oil  Commonly known as:  cannabidiol   1 drop, Oral, Daily      cholecalciferol 25 MCG (1000 UT) tablet  Commonly known as:  VITAMIN D3   1,000 Units, Oral, Daily      fluticasone 50 MCG/ACT nasal spray  Commonly known as:  FLONASE   2 sprays, Nasal, Daily      losartan 25 MG tablet  Commonly known as:  COZAAR   25 mg, Oral, Daily      metFORMIN 500 MG tablet  Commonly known as:  GLUCOPHAGE   500 mg, Oral, 2 Times Daily With Meals      naproxen 500 MG tablet  Commonly known as:  NAPROSYN   500 mg, Oral, As Needed      omeprazole 20 MG capsule  Commonly known as:  priLOSEC   20 mg, Oral, Daily      oxybutynin XL 5 MG 24 hr tablet  Commonly known as:  DITROPAN-XL   5 mg, Oral, Daily      simvastatin 80 MG tablet  Commonly known as:  ZOCOR   40 mg, Oral, Nightly      vitamin C 500 MG tablet  Commonly known as:  ASCORBIC ACID   500 mg, Oral, Daily         Stop These Medications    benzoyl peroxide 5 % external liquid  Generic drug:  benzoyl peroxide            Allergies   Allergen Reactions   • Ciprofloxacin GI Intolerance   • Levofloxacin GI Intolerance         Discharge  Disposition:  Home or Self Care    Diet:  Hospital:  Diet Order   Procedures   • Diet Regular       Activity:  Activity Instructions     Activity as Tolerated               CODE STATUS:    Code Status and Medical Interventions:   Ordered at: 12/02/19 1333     Code Status:    CPR     Medical Interventions (Level of Support Prior to Arrest):    Full       No future appointments.    Additional Instructions for the Follow-ups that You Need to Schedule     Discharge Follow-up with PCP   As directed       Currently Documented PCP:    Denis Tamez MD    PCP Phone Number:    [phone]     Follow Up Details:  first available hospital follow up         Discharge Follow-up with Specified Provider: 1 Week   As directed      Follow Up:  1 Week         Discharge Follow-up with Specified Provider: Dr. Miranda   As directed      To:  Dr. Miranda    Follow Up Details:  per his recommendations               Time Spent on Discharge:  40 minutes    Electronically signed by TABBY Oneill, 12/03/19, 2:08 PM.

## 2019-12-03 NOTE — THERAPY DISCHARGE NOTE
Acute Care - Occupational Therapy Treatment Note/Discharge  [facility]     Patient Name: Nhi Hager  : 1945  MRN: [id number]  Today's Date: 12/3/2019  Onset of Illness/Injury or Date of Surgery: 19  Date of Referral to OT: 19  Referring Physician: Dr. Miranda      Admit Date: 2019    Visit Dx:     ICD-10-CM ICD-9-CM   1. Impaired mobility and ADLs Z74.09 799.89     Patient Active Problem List   Diagnosis   • Status post reverse arthroplasty of right shoulder   • Essential hypertension   • Hyperlipidemia   • H/O non-insulin dependent diabetes mellitus   • Gout   • GERD without esophagitis       Therapy Treatment    Rehabilitation Treatment Summary     Row Name 19             Treatment Time/Intention    Discipline  occupational therapist  -AR      Document Type  therapy note (daily note);discharge treatment POD#1 right RTSA  -AR      Subjective Information  no complaints  -AR      Mode of Treatment  occupational therapy  -AR      Patient Effort  excellent  -AR      Existing Precautions/Restrictions  right;shoulder;non-weight bearing;other (see comments) renetta Fergusoncalene  -AR      Equipment Issued to Patient  long handled sponge  -AR      Recorded by [AR] Mary Carmen Stover OT 19      Row Name 19 1330             Vital Signs    Pre SpO2 (%)  95  -AR      O2 Delivery Pre Treatment  room air  -AR      Intra SpO2 (%)  96  -AR      O2 Delivery Intra Treatment  room air  -AR      Post SpO2 (%)  96  -AR      O2 Delivery Post Treatment  room air  -AR      Recorded by [AR] Mary Carmen Stover OT 19 165      Row Name 19 133             Cognitive Assessment/Intervention- PT/OT    Affect/Mental Status (Cognitive)  WNL  -AR      Orientation Status (Cognition)  oriented x 4  -AR      Follows Commands (Cognition)  WNL  -AR      Cognitive Function (Cognitive)  WNL  -AR      Recorded by [AR] Mary Carmen Stover OT 19 165      Row Name 19 1333              Safety Issues, Functional Mobility    Safety Issues Affecting Function (Mobility)  safety precaution awareness;safety precautions follow-through/compliance  -AR      Recorded by [AR] Mary Carmen Stover, OT 12/03/19 1650      Row Name 12/03/19 1330             Mobility Assessment/Intervention    Extremity Weight-bearing Status  right upper extremity  -AR      Right Upper Extremity (Weight-bearing Status)  non weight-bearing (NWB)  -AR      Recorded by [AR] Mary Carmen Stover, OT 12/03/19 1650      Row Name 12/03/19 1330             Bed Mobility Assessment/Treatment    Bed Mobility Assessment/Treatment  scooting/bridging;supine-sit;sit-supine  -AR      Scooting/Bridging [place] (Bed Mobility)  supervision  -AR      Supine-Sit [place] (Bed Mobility)  supervision  -AR      Sit-Supine [place] (Bed Mobility)  supervision  -AR      Bed Mobility, Safety Issues  decreased use of arms for pushing/pulling  -AR      Comment (Bed Mobility)  Reviewed importance of maintaining NWB RUE, reviewed safe sleeping position  -AR      Recorded by [AR] Mary Carmen Stover, OT 12/03/19 1650      Row Name 12/03/19 1330             Functional Mobility    Functional Mobility- Ind. Level  independent  -AR      Recorded by [AR] Mary Carmen Stover OT 12/03/19 1650      Row Name 12/03/19 1330             Transfer Assessment/Treatment    Transfer Assessment/Treatment  sit-stand transfer;stand-sit transfer;bed-chair transfer  -AR      Maintains Weight-bearing Status (Transfers)  able to maintain  -AR      Comment (Transfers)  Cues to attend to location of ARROW pump  -AR      Recorded by [AR] Mary Carmen Stover OT 12/03/19 1650      Row Name 12/03/19 1330             Bed-Chair Transfer    Bed-Chair [place] (Transfers)  supervision;verbal cues  -AR      Recorded by [AR] Mary Carmen Stover OT 12/03/19 1650      Row Name 12/03/19 1330             Sit-Stand Transfer    Sit-Stand [place] (Transfers)   supervision;verbal cues  -AR      Recorded by [AR] Mary Carmen Stover, OT 12/03/19 1650      Row Name 12/03/19 1330             Stand-Sit Transfer    Stand-Sit Stephen (Transfers)  supervision;verbal cues  -AR      Recorded by [AR] Mary Carmen Stover, OT 12/03/19 1650      Row Name 12/03/19 1330             ADL Assessment/Intervention    54047 - OT Self Care/Mgmt Minutes  61  -AR      BADL Assessment/Intervention  bathing;upper body dressing;lower body dressing;grooming;feeding  -AR2      Recorded by [AR] Mary Carmen Stover, OT 12/03/19 1706  [AR2] Mary Carmen Stover, OT 12/03/19 1650      Row Name 12/03/19 1330             Bathing Assessment/Intervention    Bathing Stephen Level  bathing skills;upper body;maximum assist (25% patient effort)  -AR      Bathing Position  edge of bed sitting  -AR      Comment (Bathing)  Reviewed right shoulder precautions, right axilla care to maintain and that pt may not shower until interscalene has been DC. Issued LH sponge to assist with axilla care as pt has decreased ROM L shoulder, educated pt on use.   -AR      Recorded by [AR] Mary Carmen Stover, OT 12/03/19 1650      Row Name 12/03/19 1330             Upper Body Dressing Assessment/Training    Upper Body Dressing Stephen Level  don;doff;front opening garment;maximum assist (25% patient effort) RUE sling  -AR      Assistive Devices (Upper Body Dressing)  one hand technique  -AR      Upper Body Dressing Position  edge of bed sitting  -AR      Comment (Upper Body Dressing)  Reviewed right shoulder precautions, ADL retraining to maintain, sling management and care of interscalene nerve catheter to avoid dislodgement.   -AR      Recorded by [AR] Mary Carmen Stover, OT 12/03/19 1703      Row Name 12/03/19 1330             Lower Body Dressing Assessment/Training    Lower Body Dressing Stephen Level  doff;don;socks;shoes/slippers;maximum assist (25% patient effort)  -AR      Lower Body Dressing Position   edge of bed sitting  -AR      Recorded by [AR] Mary Carmen Stover, OT 12/03/19 1703      Row Name 12/03/19 1330             Self-Feeding Assessment/Training    [place] Level (Feeding)  liquids to mouth;supervision  -AR      Position (Self-Feeding)  edge of bed sitting  -AR      Recorded by [AR] Mary Carmen Stover, OT 12/03/19 1703      Row Name 12/03/19 1330             Motor Skills Assessment/Interventions    Additional Documentation  Balance (Group);Balance Interventions (Group)  -AR      Recorded by [AR] Mary Carmen Stover, OT 12/03/19 1703      Row Name 12/03/19 1400             Therapeutic Exercise    53915 - OT Therapeutic Exercise Minutes  23  -AR      Recorded by [AR] Mary Carmen Stover OT 12/03/19 1703      Row Name 12/03/19 1400             Therapeutic Exercise    Upper Extremity Range of Motion (Therapeutic Exercise)  shoulder flexion/extension, right;shoulder internal/external rotation, right;elbow flexion/extension, right;forearm supination/pronation, right;wrist flexion/extension, right scapualr retraction  -AR      Hand (Therapeutic Exercise)  finger flexion/extension, right  -AR      Position (Therapeutic Exercise)  seated  -AR      Sets/Reps (Therapeutic Exercise)  1/10  -AR      Comment (Therapeutic Exercise)  Reviewed RUE HEP with pt and spouse and reinforced PROM -vs AROM, positioning, frequency and duration.   -AR      Recorded by [AR] Mary Carmen Stover, OT 12/03/19 1703      Row Name 12/03/19 1400             Balance    Balance  static sitting balance;static standing balance  -AR      Recorded by [AR] Mary Carmen Stover OT 12/03/19 1703      Row Name 12/03/19 1400             Static Sitting Balance    Level of [place] (Unsupported Sitting, Static Balance)  independent  -AR      Sitting Position (Unsupported Sitting, Static Balance)  sitting on edge of bed  -AR      Time Able to Maintain Position (Unsupported Sitting, Static Balance)  more than 5 minutes  -AR      Recorded by  [AR] Mary Carmen Stover, OT 12/03/19 1703      Row Name 12/03/19 1400             Static Standing Balance    Level of [place] (Supported Standing, Static Balance)  independent  -AR      Time Able to Maintain Position (Supported Standing, Static Balance)  1 to 2 minutes  -AR      Recorded by [AR] Mary Carmen Stover, OT 12/03/19 1703      Row Name 12/03/19 1400             Fine Motor Testing & Training    Comment, Fine Motor Coordination  opposition intact  -AR      Recorded by [AR] Mary Carmen Stover, OT 12/03/19 1703      Row Name 12/03/19 1400             Positioning and Restraints    Pre-Treatment Position  in bed  -AR      Post Treatment Position  bed  -AR      In Bed  notified nsg;sitting EOB;call light within reach;encouraged to call for assist;with family/caregiver;with brace RN cleared pt to be up ad pawel  -AR      Recorded by [AR] Mary Carmen Stover, OT 12/03/19 1703      Row Name 12/03/19 1400             Pain Assessment    Additional Documentation  Pain Scale: Numbers Pre/Post-Treatment (Group)  -AR      Recorded by [AR] Mary Carmen Stover, OT 12/03/19 1703      Row Name 12/03/19 1400             Pain Scale: Numbers Pre/Post-Treatment    Pain Scale: Numbers, Pretreatment  2/10  -AR      Pain Scale: Numbers, Post-Treatment  2/10  -AR      Pain Location - Side  Right  -AR      Pain Location - Orientation  anterior  -AR      Pain Location  shoulder  -AR      Pain Intervention(s)  Medication (See MAR);Cold applied;Repositioned;Ambulation/increased activity  -AR      Recorded by [AR] Mary Carmen Stover, OT 12/03/19 1703      Row Name 12/03/19 1400             Orthotic/Prosthetic Management    Orthosis Location  upper extremity orthosis  -AR      Recorded by [AR] Mary Carmen Stover, OT 12/03/19 1703      Row Name 12/03/19 1400             Upper Extremity Orthosis Management    Type (Upper Extremity Orthosis)  Marty Ultra II sling  -AR      Functional Design (Upper Extremity Orthosis)  static orthosis   -AR      Therapeutic Indications (Upper Extremity Orthosis)  post-op positioning/protection;stabilization and support  -AR      Wearing Schedule (Upper Extremity Orthosis)  remove for exercise;remove for hygiene/bathing  -AR      Orthosis Training (Upper Extremity Orthosis)  patient and caregiver;cleaning/care of orthosis;donning/doffing orthosis;orthosis adjustment;orthosis maintenance;purpose/goals of orthosis;sensory precautions;skin inspection/precautions;sleeping precautions;wearing schedule;able to verbalize training;able to show back training;able to teach back training  -AR      Compliance/Wearing Issues (Upper Extremity Orthosis)  patient/caregiver comprehend strategies  -AR      Recorded by [AR] Mary Carmen Stover OT 12/03/19 1703      Row Name                Wound 12/02/19 0827 Right shoulder Incision    Wound - Properties Group Date first assessed: 12/02/19 [JA] Time first assessed: 0827 [JA] Side: Right [JA] Location: shoulder [JA] Primary Wound Type: Incision [JA] Recorded by:  [JA] Gurvinder Cox RN 12/02/19 0827    Row Name 12/03/19 1400             Plan of Care Review    Plan of Care Reviewed With  patient;spouse  -AR      Recorded by [AR] Mary Carmen Stover OT 12/03/19 1703      Row Name 12/03/19 1400             Outcome Summary/Treatment Plan (OT)    Daily Summary of Progress (OT)  progress toward functional goals as expected;prepare for discharge  -AR      Anticipated Discharge Disposition (OT)  home with assist  -AR      Reason for Discharge (OT Discharge Summary)  patient met all goals and outcomes  -AR      Recorded by [AR] Mary Carmen Stover OT 12/03/19 1703        User Key  (r) = Recorded By, (t) = Taken By, (c) = Cosigned By    Initials Name Effective Dates Discipline    AR Mary Carmen Stover, OT 06/22/15 -  OT    Gurvinder Delarosa RN 06/16/16 -  Nurse        Wound 12/02/19 0827 Right shoulder Incision (Active)   Dressing Appearance no drainage;intact;dry 12/3/2019 11:45  AM   Periwound intact 12/2/2019  6:00 PM   Drainage Amount none 12/2/2019  6:00 PM       Rehab Goal Summary     Row Name 12/03/19 6970             Bed Mobility Goal 1 (OT)    Progress/Outcomes (Bed Mobility Goal 1, OT)  goal met  -AR         Transfer Goal 1 (OT)    Progress/Outcome (Transfer Goal 1, OT)  goal met  -AR         Dressing Goal 1 (OT)    Progress/Outcome (Dressing Goal 1, OT)  goal met  -AR         ROM Goal 1 (OT)    Progress/Outcome (ROM Goal 1, OT)  goal met  -AR         Problem Specific Goal 1 (OT)    Progress/Outcome (Problem Specific Goal 1, OT)  goal met  -AR        User Key  (r) = Recorded By, (t) = Taken By, (c) = Cosigned By    Initials Name Provider Type Discipline    Mary Carmen Pittman, OT Occupational Therapist OT          Occupational Therapy Education     Title: PT OT SLP Therapies (Done)     Topic: Occupational Therapy (Done)     Point: ADL training (Done)     Description: Instruct learner(s) on proper safety adaptation and remediation techniques during self care or transfers.   Instruct in proper use of assistive devices.    Learning Progress Summary           Patient Eager, E,TB,D,H, VU,NR by AR at 12/2/2019  2:44 PM   Family Eager, E,TB,D,H, VU,NR by AR at 12/2/2019  2:44 PM                   Point: Home exercise program (Done)     Description: Instruct learner(s) on appropriate technique for monitoring, assisting and/or progressing therapeutic exercises/activities.    Learning Progress Summary           Patient Eager, E,TB,D,H, VU,NR by AR at 12/2/2019  2:44 PM   Family Eager, E,TB,D,H, VU,NR by AR at 12/2/2019  2:44 PM                   Point: Precautions (Done)     Description: Instruct learner(s) on prescribed precautions during self-care and functional transfers.    Learning Progress Summary           Patient Eager, E,TB,D,H, VU,NR by AR at 12/2/2019  2:44 PM   Family Eager, E,TB,D,H, VU,NR by AR at 12/2/2019  2:44 PM                   Point: Body mechanics (Done)      Description: Instruct learner(s) on proper positioning and spine alignment during self-care, functional mobility activities and/or exercises.    Learning Progress Summary           Patient Jermaine, MAGUE,TB,D,H, VU,NR by AR at 12/2/2019  2:44 PM   Family MAGUE Dean,PARVEZ,D,H, VU,NR by AR at 12/2/2019  2:44 PM                               User Key     Initials Effective Dates Name Provider Type Discipline    AR 06/22/15 -  Mary Carmen Stover, OT Occupational Therapist OT                OT Recommendation and Plan  Outcome Summary/Treatment Plan (OT)  Daily Summary of Progress (OT): progress toward functional goals as expected, prepare for discharge  Anticipated Discharge Disposition (OT): home with assist  Reason for Discharge (OT Discharge Summary): patient met all goals and outcomes  Therapy Frequency (OT Eval): daily  Daily Summary of Progress (OT): progress toward functional goals as expected, prepare for discharge  Plan of Care Review  Plan of Care Reviewed With: patient, spouse  Plan of Care Reviewed With: patient, spouse  Outcome Summary: Interscalene infusing at 6, post pain 2/10 right anterior shoulder. Pt demo R shoulder PROM , IR chest/ER 30 with good teachback from spouse. Recommend DC home with family assist.     Outcome Measures     Row Name 12/03/19 1330 12/02/19 1444          How much help from another is currently needed...    Putting on and taking off regular lower body clothing?  2  -AR  2  -AR     Bathing (including washing, rinsing, and drying)  2  -AR  2  -AR     Toileting (which includes using toilet bed pan or urinal)  3  -AR  3  -AR     Putting on and taking off regular upper body clothing  2  -AR  2  -AR     Taking care of personal grooming (such as brushing teeth)  3  -AR  3  -AR     Eating meals  3  -AR  3  -AR     AM-PAC 6 Clicks Score (OT)  15  -AR  15  -AR        Functional Assessment    Outcome Measure Options  AM-PAC 6 Clicks Daily Activity (OT)  -AR  AM-PAC 6 Clicks Daily Activity (OT)   -AR       User Key  (r) = Recorded By, (t) = Taken By, (c) = Cosigned By    Initials Name Provider Type    AR Mary Carmen Stover OT Occupational Therapist           Time Calculation:    Time Calculation- OT     Row Name 12/03/19 1400 12/03/19 1330 12/03/19 0935       Time Calculation- OT    OT Start Time  --  1330  -AR  --    Total Timed Code Minutes- OT  --  84 minute(s)  -AR  --    OT Received On  --  12/03/19  -AR  --    OT Goal Re-Cert Due Date  --  12/12/19  -AR  --       Timed Charges    67977 - OT Therapeutic Exercise Minutes  23  -AR  --  --    02606 - Gait Training Minutes   --  --  15  -CS    04034 - OT Self Care/Mgmt Minutes  --  61  -AR  --      User Key  (r) = Recorded By, (t) = Taken By, (c) = Cosigned By    Initials Name Provider Type    Mary Carmen Pittman OT Occupational Therapist    Crys Yeh, PT Physical Therapist        Therapy Suggested Charges     Code   Minutes Charges    75502 (CPT®) Hc Ot Neuromusc Re Education Ea 15 Min      35227 (CPT®) Hc Ot Ther Proc Ea 15 Min 23 2    44431 (CPT®) Hc Ot Therapeutic Act Ea 15 Min      23339 (CPT®) Hc Ot Manual Therapy Ea 15 Min      97635 (CPT®) Hc Ot Iontophoresis Ea 15 Min      03994 (CPT®) Hc Ot Elec Stim Ea-Per 15 Min      13834 (CPT®) Hc Ot Ultrasound Ea 15 Min      51983 (CPT®) Hc Ot Self Care/Mgmt/Train Ea 15 Min 61 4    Total  84 6        Therapy Charges for Today     Code Description Service Date Service Provider Modifiers Qty    36422198210 HC OT EVAL MOD COMPLEXITY 4 12/2/2019 Mary Carmen Stover, OT GO 1    53713163681 HC OT THER PROC EA 15 MIN 12/2/2019 Mary Carmen Stover OT GO 1    32594654492 HC OT THERAPEUTIC ACT EA 15 MIN 12/2/2019 Mary Carmen Stover OT GO 1    88483075393 HC OT SELF CARE/MGMT/TRAIN EA 15 MIN 12/2/2019 Mary Carmen Stover OT GO 2    20488197541 HC OT THER SUPP EA 15 MIN 12/2/2019 Mary Carmen Stover, OT GO 3    36223334238  OT THER PROC EA 15 MIN 12/3/2019 Mary Carmen Stover, OT GO 2     55036079840  OT SELF CARE/MGMT/TRAIN EA 15 MIN 12/3/2019 Mary Carmen Stover, VISHAL GO 4               OT Discharge Summary  Anticipated Discharge Disposition (OT): home with assist  Reason for Discharge: Discharge from facility  Outcomes Achieved: Able to achieve all goals within established timeline  Discharge Destination: Home with assist    Mary Carmen Stover OT  12/3/2019

## 2019-12-03 NOTE — PROGRESS NOTES
Orthopedic Daily Progress Note      CC: doing well, POD #1    Pain well controlled  General: no fevers, chills  Abdomen: denies nausea, vomiting, or diarrhea    No other complaints    Physical Exam:  I have reviewed the vital signs.  Temp:  [96.4 °F (35.8 °C)-98 °F (36.7 °C)] 98 °F (36.7 °C)  Heart Rate:  [52-96] 92  Resp:  [14-18] 16  BP: (110-154)/(63-91) 132/78    Objective  General Appearance:    Alert, cooperative, no distress  Extremities: No clubbing, cyanosis, or edema to lower extremities  Pulses:  2+ in distal surgical extremity  Skin: Dressing Clean/dry/intact      Results Review:    I have reviewed the labs, radiology results and diagnostic studies:normal, high glucose    Results from last 7 days   Lab Units 12/03/19  0502   WBC 10*3/mm3 15.79*   HEMOGLOBIN g/dL 12.0*   PLATELETS 10*3/mm3 227     Results from last 7 days   Lab Units 12/03/19  0501   SODIUM mmol/L 134*   POTASSIUM mmol/L 5.5*   CO2 mmol/L 22.0   CREATININE mg/dL 1.25   GLUCOSE mg/dL 216*       I have reviewed the medications.    Assessment/Problem List  POD# 1 S/p Right rTSA    Plan  NWBRUE  Ultra sling at all times  PT OT      Discharge Planning: I expect patient to be discharged to his home when medically clear with home with assist.    Montez Gunderson MD  12/03/19  7:16 AM

## 2019-12-03 NOTE — PROGRESS NOTES
[facility]    Acute pain service Inpatient Progress Note    Patient Name: Nhi Hager  :  1945  MRN:  [id number]        Acute Pain  Service Inpatient Progress Note:    Analgesia:Excellent  LOC: alert and awake  Resp Status: room air  Cardiac: VS stable  Side Effects:None  Catheter Site:clean, dressing intact and dry  Cath type: peripheral nerve cath with ON Q  Infusion rate: 6ml/hr  Catheter Plan:Catheter to remain Insitu and Continue catheter infusion rate unchanged  Comments: Some axillary and medial biceps swelling, soreness, otherwise doing very well.  Thank you

## 2019-12-04 NOTE — PROGRESS NOTES
[facility]    Nerve Cath Post Op Call    Patient Name: Nhi Hager  :  1945  MRN:  [id number]  Date of Discharge: 12/3/2019    Nerve Cath Post Op Call:    Analgesia:Excellent  Pain Score:2/10  Side Effects:None  Catheter Site:clean  Patient Controlled ON Q pump infusion rate: 6ml/hr  Catheter Plan:Will continue with plan at home without changes and The patient was instructed to call ON CALL Anesthesia provider for any questions or problems

## 2019-12-06 NOTE — PROGRESS NOTES
[facility]    Nerve Cath Post Op Call    Patient Name: Nhi Hager  :  1945  MRN:  [id number]  Date of Discharge: 12/3/2019    Nerve Cath Post Op Call:    Catheter Plan:Patient/Family member report nerve catheter previously discontinued, tip intact

## 2020-10-27 ENCOUNTER — APPOINTMENT (OUTPATIENT)
Dept: PREADMISSION TESTING | Facility: HOSPITAL | Age: 75
End: 2020-10-27

## 2020-10-29 ENCOUNTER — APPOINTMENT (OUTPATIENT)
Dept: PREADMISSION TESTING | Facility: HOSPITAL | Age: 75
End: 2020-10-29

## 2020-11-06 ENCOUNTER — APPOINTMENT (OUTPATIENT)
Dept: PREADMISSION TESTING | Facility: HOSPITAL | Age: 75
End: 2020-11-06

## 2020-12-22 ENCOUNTER — HOSPITAL ENCOUNTER (OUTPATIENT)
Dept: GENERAL RADIOLOGY | Facility: HOSPITAL | Age: 75
Discharge: HOME | End: 2020-12-22

## 2020-12-22 ENCOUNTER — APPOINTMENT (OUTPATIENT)
Dept: PREADMISSION TESTING | Facility: HOSPITAL | Age: 75
End: 2020-12-22

## 2020-12-22 VITALS — HEIGHT: 68 IN | BODY MASS INDEX: 25.86 KG/M2 | WEIGHT: 170.64 LBS

## 2020-12-22 LAB
ANION GAP SERPL CALCULATED.3IONS-SCNC: 10 MMOL/L (ref 5–15)
BUN SERPL-MCNC: 39 MG/DL (ref 8–23)
BUN/CREAT SERPL: 19.9 (ref 7–25)
CALCIUM SPEC-SCNC: 9.6 MG/DL (ref 8.6–10.5)
CHLORIDE SERPL-SCNC: 105 MMOL/L (ref 98–107)
CO2 SERPL-SCNC: 24 MMOL/L (ref 22–29)
CREAT SERPL-MCNC: 1.96 MG/DL (ref 0.76–1.27)
DEPRECATED RDW RBC AUTO: 46 FL (ref 37–54)
ERYTHROCYTE [DISTWIDTH] IN BLOOD BY AUTOMATED COUNT: 12.7 % (ref 12.3–15.4)
GFR SERPL CREATININE-BSD FRML MDRD: 33 ML/MIN/1.73
GLUCOSE SERPL-MCNC: 158 MG/DL (ref 65–99)
HBA1C MFR BLD: 6.2 % (ref 4.8–5.6)
HCT VFR BLD AUTO: 39 % (ref 37.5–51)
HGB BLD-MCNC: 12.5 G/DL (ref 13–17.7)
MCH RBC QN AUTO: 31.5 PG (ref 26.6–33)
MCHC RBC AUTO-ENTMCNC: 32.1 G/DL (ref 31.5–35.7)
MCV RBC AUTO: 98.2 FL (ref 79–97)
PLATELET # BLD AUTO: 232 10*3/MM3 (ref 140–450)
PMV BLD AUTO: 9.8 FL (ref 6–12)
POTASSIUM SERPL-SCNC: 4.6 MMOL/L (ref 3.5–5.2)
QT INTERVAL: 392 MS
QTC INTERVAL: 443 MS
RBC # BLD AUTO: 3.97 10*6/MM3 (ref 4.14–5.8)
SODIUM SERPL-SCNC: 139 MMOL/L (ref 136–145)
WBC # BLD AUTO: 6.82 10*3/MM3 (ref 3.4–10.8)

## 2020-12-22 PROCEDURE — 80048 BASIC METABOLIC PNL TOTAL CA: CPT

## 2020-12-22 PROCEDURE — 93010 ELECTROCARDIOGRAM REPORT: CPT | Performed by: INTERNAL MEDICINE

## 2020-12-22 PROCEDURE — 71046 X-RAY EXAM CHEST 2 VIEWS: CPT

## 2020-12-22 PROCEDURE — 83036 HEMOGLOBIN GLYCOSYLATED A1C: CPT

## 2020-12-22 PROCEDURE — 36415 COLL VENOUS BLD VENIPUNCTURE: CPT

## 2020-12-22 PROCEDURE — 85027 COMPLETE CBC AUTOMATED: CPT

## 2020-12-22 PROCEDURE — 93005 ELECTROCARDIOGRAM TRACING: CPT

## 2020-12-22 RX ORDER — ASPIRIN 81 MG/1
81 TABLET ORAL DAILY PRN
COMMUNITY

## 2020-12-22 RX ORDER — OXYCODONE HYDROCHLORIDE AND ACETAMINOPHEN 5; 325 MG/1; MG/1
1 TABLET ORAL EVERY 6 HOURS PRN
COMMUNITY

## 2020-12-22 RX ORDER — ACETAMINOPHEN 500 MG
500 TABLET ORAL EVERY 6 HOURS PRN
COMMUNITY

## 2020-12-22 RX ORDER — ROSUVASTATIN CALCIUM 40 MG/1
40 TABLET, COATED ORAL DAILY
COMMUNITY

## 2020-12-22 RX ORDER — GLIPIZIDE 5 MG/1
5 TABLET, FILM COATED, EXTENDED RELEASE ORAL DAILY
COMMUNITY

## 2020-12-22 NOTE — PAT
Per Anesthesia Request, patient instructed not to take their ACE/ARB medications on the AM of surgery.      Patient given a prescription for Benzol Peroxide 5% wash during PAT visit.  Instructed them to fill the prescription or  from [facility] pharmacy if was submitted electronically by the surgeon's office.  Verbal and written instructions given regarding proper use of the Benzoyl Peroxide wash given to patient and/or famlily during PAT visit. Patient/family also instructed to complete checklist and return it to Pre-op on the day of surgery.  Patient and/or family verbalized understanding.      Additionally, reinforced with patient to acquire this prescription from the [facility] retail pharmacy before leaving the hospital after PAT visit due to the potential unavailability at local pharmacies.      PATIENT SENT TO RADIOLOGY AFTER PAT APPT.     COVID TEST SCHEDULED 1/2/2020. PATIENT AWARE.

## 2020-12-23 NOTE — PAT
Farideh was notified of patient's Abnormal lab values -Farideh stated she was aware and had already addressed it with Dr. Miranda-no new orders at this time.

## 2021-01-02 ENCOUNTER — APPOINTMENT (OUTPATIENT)
Dept: PREADMISSION TESTING | Facility: HOSPITAL | Age: 76
End: 2021-01-02

## 2021-01-02 PROCEDURE — C9803 HOPD COVID-19 SPEC COLLECT: HCPCS

## 2021-01-02 PROCEDURE — U0004 COV-19 TEST NON-CDC HGH THRU: HCPCS

## 2021-01-03 LAB — SARS-COV-2 RNA RESP QL NAA+PROBE: NOT DETECTED

## 2021-01-04 ENCOUNTER — ANESTHESIA (OUTPATIENT)
Dept: PERIOP | Facility: HOSPITAL | Age: 76
End: 2021-01-04

## 2021-01-04 ENCOUNTER — HOSPITAL ENCOUNTER (OUTPATIENT)
Dept: GENERAL RADIOLOGY | Facility: HOSPITAL | Age: 76
Setting detail: SURGERY ADMIT
Discharge: HOME | End: 2021-01-04

## 2021-01-04 ENCOUNTER — HOSPITAL ENCOUNTER (OUTPATIENT)
Dept: GENERAL RADIOLOGY | Facility: HOSPITAL | Age: 76
Setting detail: SURGERY ADMIT
End: 2021-01-04

## 2021-01-04 ENCOUNTER — ANESTHESIA EVENT (OUTPATIENT)
Dept: PERIOP | Facility: HOSPITAL | Age: 76
End: 2021-01-04

## 2021-01-04 ENCOUNTER — ANESTHESIA EVENT CONVERTED (OUTPATIENT)
Dept: ANESTHESIOLOGY | Facility: HOSPITAL | Age: 76
End: 2021-01-04

## 2021-01-04 ENCOUNTER — HOSPITAL ENCOUNTER (OUTPATIENT)
Facility: HOSPITAL | Age: 76
Discharge: HOME | End: 2021-01-04
Attending: ORTHOPAEDIC SURGERY | Admitting: ORTHOPAEDIC SURGERY

## 2021-01-04 VITALS
WEIGHT: 170 LBS | HEIGHT: 68 IN | TEMPERATURE: 96.2 F | HEART RATE: 78 BPM | BODY MASS INDEX: 25.76 KG/M2 | RESPIRATION RATE: 16 BRPM | SYSTOLIC BLOOD PRESSURE: 135 MMHG | OXYGEN SATURATION: 96 % | DIASTOLIC BLOOD PRESSURE: 62 MMHG

## 2021-01-04 DIAGNOSIS — Z96.612 STATUS POST TOTAL REPLACEMENT OF LEFT SHOULDER: Primary | ICD-10-CM

## 2021-01-04 LAB
GLUCOSE BLDC GLUCOMTR-MCNC: 112 MG/DL (ref 70–130)
GLUCOSE BLDC GLUCOMTR-MCNC: 143 MG/DL (ref 70–130)

## 2021-01-04 PROCEDURE — 25010000002 ONDANSETRON PER 1 MG: Performed by: NURSE ANESTHETIST, CERTIFIED REGISTERED

## 2021-01-04 PROCEDURE — L3670 SO ACRO/CLAV CAN WEB PRE OTS: HCPCS | Performed by: ORTHOPAEDIC SURGERY

## 2021-01-04 PROCEDURE — C1776 JOINT DEVICE (IMPLANTABLE): HCPCS | Performed by: ORTHOPAEDIC SURGERY

## 2021-01-04 PROCEDURE — 25010000002 PROPOFOL 10 MG/ML EMULSION: Performed by: NURSE ANESTHETIST, CERTIFIED REGISTERED

## 2021-01-04 PROCEDURE — C1713 ANCHOR/SCREW BN/BN,TIS/BN: HCPCS | Performed by: ORTHOPAEDIC SURGERY

## 2021-01-04 PROCEDURE — 73030 X-RAY EXAM OF SHOULDER: CPT

## 2021-01-04 PROCEDURE — 97110 THERAPEUTIC EXERCISES: CPT | Performed by: OCCUPATIONAL THERAPIST

## 2021-01-04 PROCEDURE — 25010000002 FENTANYL CITRATE (PF) 100 MCG/2ML SOLUTION: Performed by: ANESTHESIOLOGY

## 2021-01-04 PROCEDURE — 97530 THERAPEUTIC ACTIVITIES: CPT | Performed by: OCCUPATIONAL THERAPIST

## 2021-01-04 PROCEDURE — 25010000002 NEOSTIGMINE 10 MG/10ML SOLUTION: Performed by: NURSE ANESTHETIST, CERTIFIED REGISTERED

## 2021-01-04 PROCEDURE — 25010000002 DEXAMETHASONE PER 1 MG: Performed by: NURSE ANESTHETIST, CERTIFIED REGISTERED

## 2021-01-04 PROCEDURE — 25010000002 CEFAZOLIN PER 500 MG: Performed by: ORTHOPAEDIC SURGERY

## 2021-01-04 PROCEDURE — 25010000002 PHENYLEPHRINE PER 1 ML: Performed by: NURSE ANESTHETIST, CERTIFIED REGISTERED

## 2021-01-04 PROCEDURE — 97535 SELF CARE MNGMENT TRAINING: CPT | Performed by: OCCUPATIONAL THERAPIST

## 2021-01-04 PROCEDURE — 25010000002 ROPIVACAINE PER 1 MG: Performed by: NURSE ANESTHETIST, CERTIFIED REGISTERED

## 2021-01-04 PROCEDURE — 97165 OT EVAL LOW COMPLEX 30 MIN: CPT | Performed by: OCCUPATIONAL THERAPIST

## 2021-01-04 PROCEDURE — 82962 GLUCOSE BLOOD TEST: CPT

## 2021-01-04 PROCEDURE — 25010000002 VANCOMYCIN 10 G RECONSTITUTED SOLUTION: Performed by: ORTHOPAEDIC SURGERY

## 2021-01-04 DEVICE — TOTL SHLDR TTL AUG: Type: IMPLANTABLE DEVICE | Site: SHOULDER | Status: FUNCTIONAL

## 2021-01-04 DEVICE — SUT FW #2 W/TPR NDL 1/2 CIR 38IN 97CM 26.5MM BLU: Type: IMPLANTABLE DEVICE | Site: SHOULDER | Status: FUNCTIONAL

## 2021-01-04 DEVICE — IMPLANTABLE DEVICE
Type: IMPLANTABLE DEVICE | Site: SHOULDER | Status: FUNCTIONAL
Brand: EQUINOXE

## 2021-01-04 DEVICE — HEMOST ABS SURGIFOAM SZ100 8X12 10MM: Type: IMPLANTABLE DEVICE | Site: SHOULDER | Status: FUNCTIONAL

## 2021-01-04 DEVICE — SMARTSET HIGH PERFORMANCE MV MEDIUM VISCOSITY BONE CEMENT 40G
Type: IMPLANTABLE DEVICE | Site: SHOULDER | Status: FUNCTIONAL
Brand: SMARTSET

## 2021-01-04 RX ORDER — ONDANSETRON 2 MG/ML
4 INJECTION INTRAMUSCULAR; INTRAVENOUS ONCE AS NEEDED
Status: DISCONTINUED | OUTPATIENT
Start: 2021-01-04 | End: 2021-01-04 | Stop reason: HOSPADM

## 2021-01-04 RX ORDER — ACETAMINOPHEN 325 MG/1
650 TABLET ORAL EVERY 4 HOURS PRN
Status: DISCONTINUED | OUTPATIENT
Start: 2021-01-04 | End: 2021-01-04 | Stop reason: HOSPADM

## 2021-01-04 RX ORDER — PROPOFOL 10 MG/ML
VIAL (ML) INTRAVENOUS AS NEEDED
Status: DISCONTINUED | OUTPATIENT
Start: 2021-01-04 | End: 2021-01-04 | Stop reason: SURG

## 2021-01-04 RX ORDER — SODIUM CHLORIDE 0.9 % (FLUSH) 0.9 %
10 SYRINGE (ML) INJECTION AS NEEDED
Status: DISCONTINUED | OUTPATIENT
Start: 2021-01-04 | End: 2021-01-04

## 2021-01-04 RX ORDER — NALOXONE HCL 0.4 MG/ML
0.4 VIAL (ML) INJECTION AS NEEDED
Status: DISCONTINUED | OUTPATIENT
Start: 2021-01-04 | End: 2021-01-04 | Stop reason: HOSPADM

## 2021-01-04 RX ORDER — FENTANYL CITRATE 50 UG/ML
50 INJECTION, SOLUTION INTRAMUSCULAR; INTRAVENOUS
Status: DISCONTINUED | OUTPATIENT
Start: 2021-01-04 | End: 2021-01-04 | Stop reason: HOSPADM

## 2021-01-04 RX ORDER — SODIUM CHLORIDE, SODIUM LACTATE, POTASSIUM CHLORIDE, CALCIUM CHLORIDE 600; 310; 30; 20 MG/100ML; MG/100ML; MG/100ML; MG/100ML
9 INJECTION, SOLUTION INTRAVENOUS CONTINUOUS
Status: DISCONTINUED | OUTPATIENT
Start: 2021-01-04 | End: 2021-01-04

## 2021-01-04 RX ORDER — SODIUM CHLORIDE 0.9 % (FLUSH) 0.9 %
10 SYRINGE (ML) INJECTION AS NEEDED
Status: DISCONTINUED | OUTPATIENT
Start: 2021-01-04 | End: 2021-01-04 | Stop reason: HOSPADM

## 2021-01-04 RX ORDER — OXYCODONE HYDROCHLORIDE 5 MG/1
5 TABLET ORAL EVERY 4 HOURS PRN
Status: DISCONTINUED | OUTPATIENT
Start: 2021-01-04 | End: 2021-01-04 | Stop reason: HOSPADM

## 2021-01-04 RX ORDER — ATRACURIUM BESYLATE 10 MG/ML
INJECTION, SOLUTION INTRAVENOUS AS NEEDED
Status: DISCONTINUED | OUTPATIENT
Start: 2021-01-04 | End: 2021-01-04 | Stop reason: SURG

## 2021-01-04 RX ORDER — SODIUM CHLORIDE 0.9 % (FLUSH) 0.9 %
10 SYRINGE (ML) INJECTION EVERY 12 HOURS SCHEDULED
Status: DISCONTINUED | OUTPATIENT
Start: 2021-01-04 | End: 2021-01-04 | Stop reason: HOSPADM

## 2021-01-04 RX ORDER — ONDANSETRON 2 MG/ML
INJECTION INTRAMUSCULAR; INTRAVENOUS AS NEEDED
Status: DISCONTINUED | OUTPATIENT
Start: 2021-01-04 | End: 2021-01-04 | Stop reason: SURG

## 2021-01-04 RX ORDER — BUPIVACAINE HYDROCHLORIDE 2.5 MG/ML
INJECTION, SOLUTION EPIDURAL; INFILTRATION; INTRACAUDAL
Status: COMPLETED | OUTPATIENT
Start: 2021-01-04 | End: 2021-01-04

## 2021-01-04 RX ORDER — SODIUM CHLORIDE 0.9 % (FLUSH) 0.9 %
10 SYRINGE (ML) INJECTION EVERY 12 HOURS SCHEDULED
Status: DISCONTINUED | OUTPATIENT
Start: 2021-01-04 | End: 2021-01-04

## 2021-01-04 RX ORDER — MIDAZOLAM HYDROCHLORIDE 1 MG/ML
1 INJECTION INTRAMUSCULAR; INTRAVENOUS
Status: DISCONTINUED | OUTPATIENT
Start: 2021-01-04 | End: 2021-01-04 | Stop reason: HOSPADM

## 2021-01-04 RX ORDER — DEXAMETHASONE SODIUM PHOSPHATE 4 MG/ML
INJECTION, SOLUTION INTRA-ARTICULAR; INTRALESIONAL; INTRAMUSCULAR; INTRAVENOUS; SOFT TISSUE AS NEEDED
Status: DISCONTINUED | OUTPATIENT
Start: 2021-01-04 | End: 2021-01-04 | Stop reason: SURG

## 2021-01-04 RX ORDER — FAMOTIDINE 20 MG/1
20 TABLET, FILM COATED ORAL
Status: COMPLETED | OUTPATIENT
Start: 2021-01-04 | End: 2021-01-04

## 2021-01-04 RX ORDER — LIDOCAINE HYDROCHLORIDE 10 MG/ML
INJECTION, SOLUTION EPIDURAL; INFILTRATION; INTRACAUDAL; PERINEURAL AS NEEDED
Status: DISCONTINUED | OUTPATIENT
Start: 2021-01-04 | End: 2021-01-04 | Stop reason: SURG

## 2021-01-04 RX ORDER — HYDROMORPHONE HYDROCHLORIDE 1 MG/ML
0.5 INJECTION, SOLUTION INTRAMUSCULAR; INTRAVENOUS; SUBCUTANEOUS
Status: DISCONTINUED | OUTPATIENT
Start: 2021-01-04 | End: 2021-01-04 | Stop reason: HOSPADM

## 2021-01-04 RX ORDER — NALOXONE HCL 0.4 MG/ML
0.1 VIAL (ML) INJECTION
Status: DISCONTINUED | OUTPATIENT
Start: 2021-01-04 | End: 2021-01-04 | Stop reason: HOSPADM

## 2021-01-04 RX ORDER — FAMOTIDINE 20 MG/1
20 TABLET, FILM COATED ORAL ONCE
Status: DISCONTINUED | OUTPATIENT
Start: 2021-01-04 | End: 2021-01-04

## 2021-01-04 RX ORDER — HYDROCODONE BITARTRATE AND ACETAMINOPHEN 5; 325 MG/1; MG/1
1 TABLET ORAL ONCE AS NEEDED
Status: DISCONTINUED | OUTPATIENT
Start: 2021-01-04 | End: 2021-01-04 | Stop reason: HOSPADM

## 2021-01-04 RX ORDER — OXYCODONE HYDROCHLORIDE 5 MG/1
5 TABLET ORAL EVERY 4 HOURS PRN
Qty: 25 TABLET | Refills: 0 | Status: SHIPPED | OUTPATIENT
Start: 2021-01-04

## 2021-01-04 RX ORDER — PROMETHAZINE HYDROCHLORIDE 25 MG/1
25 TABLET ORAL ONCE AS NEEDED
Status: DISCONTINUED | OUTPATIENT
Start: 2021-01-04 | End: 2021-01-04 | Stop reason: HOSPADM

## 2021-01-04 RX ORDER — LIDOCAINE HYDROCHLORIDE 10 MG/ML
0.5 INJECTION, SOLUTION EPIDURAL; INFILTRATION; INTRACAUDAL; PERINEURAL ONCE AS NEEDED
Status: COMPLETED | OUTPATIENT
Start: 2021-01-04 | End: 2021-01-04

## 2021-01-04 RX ORDER — CEFAZOLIN SODIUM 2 G/100ML
2 INJECTION, SOLUTION INTRAVENOUS ONCE
Status: COMPLETED | OUTPATIENT
Start: 2021-01-04 | End: 2021-01-04

## 2021-01-04 RX ORDER — IPRATROPIUM BROMIDE AND ALBUTEROL SULFATE 2.5; .5 MG/3ML; MG/3ML
3 SOLUTION RESPIRATORY (INHALATION) ONCE AS NEEDED
Status: DISCONTINUED | OUTPATIENT
Start: 2021-01-04 | End: 2021-01-04 | Stop reason: HOSPADM

## 2021-01-04 RX ORDER — LIDOCAINE HYDROCHLORIDE 10 MG/ML
0.5 INJECTION, SOLUTION EPIDURAL; INFILTRATION; INTRACAUDAL; PERINEURAL ONCE AS NEEDED
Status: DISCONTINUED | OUTPATIENT
Start: 2021-01-04 | End: 2021-01-04

## 2021-01-04 RX ORDER — SODIUM CHLORIDE 9 MG/ML
9 INJECTION, SOLUTION INTRAVENOUS CONTINUOUS PRN
Status: DISCONTINUED | OUTPATIENT
Start: 2021-01-04 | End: 2021-01-04 | Stop reason: HOSPADM

## 2021-01-04 RX ORDER — SODIUM CHLORIDE 0.9 % (FLUSH) 0.9 %
3 SYRINGE (ML) INJECTION EVERY 12 HOURS SCHEDULED
Status: DISCONTINUED | OUTPATIENT
Start: 2021-01-04 | End: 2021-01-04 | Stop reason: HOSPADM

## 2021-01-04 RX ORDER — FENTANYL CITRATE 50 UG/ML
INJECTION, SOLUTION INTRAMUSCULAR; INTRAVENOUS
Status: COMPLETED | OUTPATIENT
Start: 2021-01-04 | End: 2021-01-04

## 2021-01-04 RX ORDER — ACETAMINOPHEN 500 MG
1000 TABLET ORAL ONCE
Status: COMPLETED | OUTPATIENT
Start: 2021-01-04 | End: 2021-01-04

## 2021-01-04 RX ORDER — SODIUM CHLORIDE 0.9 % (FLUSH) 0.9 %
3-10 SYRINGE (ML) INJECTION AS NEEDED
Status: DISCONTINUED | OUTPATIENT
Start: 2021-01-04 | End: 2021-01-04 | Stop reason: HOSPADM

## 2021-01-04 RX ORDER — SODIUM CHLORIDE 450 MG/100ML
50 INJECTION, SOLUTION INTRAVENOUS CONTINUOUS
Status: DISCONTINUED | OUTPATIENT
Start: 2021-01-04 | End: 2021-01-04 | Stop reason: HOSPADM

## 2021-01-04 RX ORDER — GLYCOPYRROLATE 0.2 MG/ML
INJECTION INTRAMUSCULAR; INTRAVENOUS AS NEEDED
Status: DISCONTINUED | OUTPATIENT
Start: 2021-01-04 | End: 2021-01-04 | Stop reason: SURG

## 2021-01-04 RX ORDER — ACETAMINOPHEN 650 MG/1
650 SUPPOSITORY RECTAL EVERY 4 HOURS PRN
Status: DISCONTINUED | OUTPATIENT
Start: 2021-01-04 | End: 2021-01-04 | Stop reason: HOSPADM

## 2021-01-04 RX ORDER — NEOSTIGMINE METHYLSULFATE 1 MG/ML
INJECTION, SOLUTION INTRAVENOUS AS NEEDED
Status: DISCONTINUED | OUTPATIENT
Start: 2021-01-04 | End: 2021-01-04 | Stop reason: SURG

## 2021-01-04 RX ORDER — MAGNESIUM HYDROXIDE 1200 MG/15ML
LIQUID ORAL AS NEEDED
Status: DISCONTINUED | OUTPATIENT
Start: 2021-01-04 | End: 2021-01-04 | Stop reason: HOSPADM

## 2021-01-04 RX ORDER — PROMETHAZINE HYDROCHLORIDE 25 MG/1
25 SUPPOSITORY RECTAL ONCE AS NEEDED
Status: DISCONTINUED | OUTPATIENT
Start: 2021-01-04 | End: 2021-01-04 | Stop reason: HOSPADM

## 2021-01-04 RX ORDER — FAMOTIDINE 10 MG/ML
20 INJECTION, SOLUTION INTRAVENOUS ONCE
Status: DISCONTINUED | OUTPATIENT
Start: 2021-01-04 | End: 2021-01-04

## 2021-01-04 RX ADMIN — FENTANYL CITRATE 100 MCG: 50 INJECTION, SOLUTION INTRAMUSCULAR; INTRAVENOUS at 07:06

## 2021-01-04 RX ADMIN — EPHEDRINE SULFATE 25 MG: 50 INJECTION INTRAMUSCULAR; INTRAVENOUS; SUBCUTANEOUS at 07:40

## 2021-01-04 RX ADMIN — GLYCOPYRROLATE 0.2 MG: 0.2 INJECTION INTRAMUSCULAR; INTRAVENOUS at 08:32

## 2021-01-04 RX ADMIN — LIDOCAINE HYDROCHLORIDE 50 MG: 10 INJECTION, SOLUTION EPIDURAL; INFILTRATION; INTRACAUDAL; PERINEURAL at 07:29

## 2021-01-04 RX ADMIN — GLYCOPYRROLATE 0.2 MG: 0.2 INJECTION INTRAMUSCULAR; INTRAVENOUS at 08:46

## 2021-01-04 RX ADMIN — PROPOFOL 150 MG: 10 INJECTION, EMULSION INTRAVENOUS at 07:29

## 2021-01-04 RX ADMIN — Medication 1000 MG: at 08:43

## 2021-01-04 RX ADMIN — ACETAMINOPHEN 1000 MG: 500 TABLET ORAL at 06:41

## 2021-01-04 RX ADMIN — SODIUM CHLORIDE 9 ML/HR: 9 INJECTION, SOLUTION INTRAVENOUS at 06:41

## 2021-01-04 RX ADMIN — PHENYLEPHRINE HYDROCHLORIDE 80 MCG: 10 INJECTION INTRAVENOUS at 07:40

## 2021-01-04 RX ADMIN — FAMOTIDINE 20 MG: 20 TABLET, FILM COATED ORAL at 06:41

## 2021-01-04 RX ADMIN — PHENYLEPHRINE HYDROCHLORIDE 80 MCG: 10 INJECTION INTRAVENOUS at 08:24

## 2021-01-04 RX ADMIN — ROPIVACAINE HYDROCHLORIDE 6 ML/HR: 5 INJECTION, SOLUTION EPIDURAL; INFILTRATION; PERINEURAL at 09:43

## 2021-01-04 RX ADMIN — ATRACURIUM BESYLATE 40 MG: 10 INJECTION, SOLUTION INTRAVENOUS at 07:29

## 2021-01-04 RX ADMIN — LIDOCAINE HYDROCHLORIDE 0.5 ML: 10 INJECTION, SOLUTION EPIDURAL; INFILTRATION; INTRACAUDAL; PERINEURAL at 06:41

## 2021-01-04 RX ADMIN — BUPIVACAINE HYDROCHLORIDE 5 ML: 2.5 INJECTION, SOLUTION EPIDURAL; INFILTRATION; INTRACAUDAL; PERINEURAL at 08:47

## 2021-01-04 RX ADMIN — Medication 1000 MG: at 07:42

## 2021-01-04 RX ADMIN — BUPIVACAINE HYDROCHLORIDE 15 ML: 2.5 INJECTION, SOLUTION EPIDURAL; INFILTRATION; INTRACAUDAL; PERINEURAL at 07:06

## 2021-01-04 RX ADMIN — VANCOMYCIN HYDROCHLORIDE 1250 MG: 100 INJECTION, POWDER, LYOPHILIZED, FOR SOLUTION INTRAVENOUS at 07:19

## 2021-01-04 RX ADMIN — ROPIVACAINE HYDROCHLORIDE 6 ML/HR: 5 INJECTION, SOLUTION EPIDURAL; INFILTRATION; PERINEURAL at 09:15

## 2021-01-04 RX ADMIN — PROPOFOL 25 MCG/KG/MIN: 10 INJECTION, EMULSION INTRAVENOUS at 07:35

## 2021-01-04 RX ADMIN — DEXAMETHASONE SODIUM PHOSPHATE 8 MG: 4 INJECTION, SOLUTION INTRAMUSCULAR; INTRAVENOUS at 07:36

## 2021-01-04 RX ADMIN — ONDANSETRON 4 MG: 2 INJECTION INTRAMUSCULAR; INTRAVENOUS at 08:43

## 2021-01-04 RX ADMIN — CEFAZOLIN 2 G: 10 INJECTION, POWDER, FOR SOLUTION INTRAVENOUS at 06:46

## 2021-01-04 RX ADMIN — LIDOCAINE HYDROCHLORIDE 30 MG: 10 INJECTION, SOLUTION EPIDURAL; INFILTRATION; INTRACAUDAL; PERINEURAL at 09:00

## 2021-01-04 RX ADMIN — PHENYLEPHRINE HYDROCHLORIDE 80 MCG: 10 INJECTION INTRAVENOUS at 07:36

## 2021-01-04 RX ADMIN — NEOSTIGMINE 3 MG: 1 INJECTION INTRAVENOUS at 08:46

## 2021-01-04 NOTE — OP NOTE
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    POSTOPERATIVE DIAGNOSES:  1. 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    2. Biceps tenosynovitis.    PROCEDURES PERFORMED:  1. Left total shoulder arthroplasty.    2. Left biceps tenodesis.    SURGEON: Sundar Miranda MD  ASSISTANTS:  1. Joaquín Quach MD, PGY-5.    ANESTHESIA: General plus block.    ESTIMATED BLOOD LOSS:50mL.    COMPLICATIONS: None.    DISPOSITION: Recovery room in stable condition.    IMPLANTS: [employer] Equinoxe total shoulder system, 8 mm preserve stem press-fit, 4.5 replicator plate, 47 short humeral head, and left medium posterior augment cage glenoid peripherally cemented.    INDICATIONS: This is a 75-year-old male with left shoulder pain and limited function and motion secondary to DJD. They have failed conservative treatment and after a discussion of risks, benefits, and alternatives, wished to proceed with shoulder arthroplasty.  DESCRIPTION OF PROCEDURE: On the day of surgery, he identified the left shoulder as the correct operative extremity. This was initialed by the surgeon with the patients's acknowledgment. The patient underwent placement of an interscalene block and was taken to the operating room and placed in the supine position. Upon induction of adequate anesthesia, the patient was brought up to the beach chair position and the shoulder and upper extremity were prepped and draped in the usual sterile fashion. Timeout confirmed the correct patient and operative extremity as well as that antibiotics were on board. A standard deltopectoral approach to the shoulder was carried out. It was carried sharply through the skin and subcutaneous tissue. Medial and lateral flaps were developed over the deltopectoral fascia. The cephalic vein was identified and mobilized laterally with the deltoid.  The subdeltoid and subpectoral spaces were mobilized and a blunt retractor was placed deep to this. The clavipectoral fascia was opened on the lateral edge of the conjoined tendon and the retractor was moved deep to this. The leading edge of the pectoralis was released exposing the long head of the biceps. This was tenosynovitic. It was tenodesed to the pectoralis and released proximal to this. The 3 sisters were identified and coagulated. A subscapularis tenotomy was performed 1 cm medial to the lesser tuberosity and rotator interval was released to the glenoid exposing the humeral head. The inferior capsule was released directly off the humerus to allow greater than 90° of external rotation. A large inferior osteophyte was present which was removed with rongeur. The anatomic neck was exposed and the humeral head osteotomy was performed in approximately 30° of retroversion. The remainder of the osteophytes were removed. The canal was then entered, reamed, and broached. The final stem impacted in in approximately 30° of retroversion. A head protector was placed. The humerus was subluxed posteriorly. The glenoid exposed. Circumferential labral excision and capsular release were performed. A 270° mobilization of the subscapularis was carried out as well.  A centering hole was drilled. The glenoid was gently reamed and peripheral holes were drilled and trialing was carried out. The appropriate size was chosen. Thrombin-soaked Gelfoam was inserted into the holes to obtain hemostasis. Once the cement was prepared, Gelfoam was removed. Cement was inserted into the 3 peripheral holes and pressurized twice prior to impaction of the caged glenoid. Excellent fit was achieved with no rocking or instability. No excess cement was identified. The humerus was carefully subluxed back anteriorly. A 4.5 replicator plate was chosen and trialing was carried out. The appropriate head size and position were chosen and allowed  approximately 50% posterior subluxation with spontaneous reduction, 25% inferior subluxation with spontaneous reduction, and full passive range of motion. The joint was copiously irrigated with orthopedic irrigation mixed with Betadine after the final implants were assembled and locked into place. The shoulder was reduced. The subscapularis was meticulously repaired with #2 FiberWire as was the rotator interval. This was stable to approximately 70° of external rotation, but will be limited to 30° in the perioperative period. The deltopectoral interval was approximated with 0 Vicryl, the subcutaneous tissue with 2-0 Vicryl, and the skin with Monocryl and Dermabond. A sterile dressing was placed. Anesthesia was reversed and the patient was taken to the recovery room in stable condition. All instrument, needle, and sponge counts were correct.      Sundar Miranda MD*

## 2021-01-04 NOTE — PLAN OF CARE
Goal Outcome Evaluation:  Plan of Care Reviewed With: patient, spouse     Outcome Summary: Interscalene infusing at 6, no c/o pain. Pt tolerated L shoulder PROM , IR chest/ER 30 with good teachback from spouse. Pt ambulated 300 feet on RA and no issues with desaturation or LOB. Pt passed mobility screen, no PT needs. Recommend DC home with family assist. Issued rehab department phone number in case questions arise.

## 2021-01-04 NOTE — ANESTHESIA PROCEDURE NOTES
Airway  Date/Time: 1/4/2021 7:31 AM  Airway not difficult    General Information and Staff    Patient location during procedure: OR  CRNA: Denis Bonilla MD    Indications and Patient Condition  Indications for airway management: airway protection    Preoxygenated: yes  MILS maintained throughout  Mask difficulty assessment: 1 - vent by mask    Final Airway Details  Final airway type: endotracheal airway      Successful airway: ETT  Cuffed: yes   Successful intubation technique: direct laryngoscopy  Facilitating devices/methods: intubating stylet  Endotracheal tube insertion site: oral  Blade: Debbie  Blade size: 3  ETT size (mm): 7.5  Cormack-Lehane Classification: grade I - full view of glottis  Placement verified by: chest auscultation and capnometry   Measured from: gums  ETT/EBT to gums (cm): 22  Number of attempts at approach: 1  Assessment: lips, teeth, and gum same as pre-op and atraumatic intubation

## 2021-01-04 NOTE — BRIEF OP NOTE
TOTAL SHOULDER ARTHROPLASTY  Progress Note    Nhi Hager  1/4/2021    Pre-op Diagnosis:   Primary osteoarthritis, left shoulder [M19.012]       Post-Op Diagnosis Codes:     * Primary osteoarthritis, left shoulder [M19.012]     * Left bicipital tenosynovitis [M75.22]    Procedure/CPT® Codes:  FL RECONSTR TOTAL SHOULDER IMPLANT [98583]  FL REPAIR BICEPS LONG TENDON [33008]      Procedure(s):  TOTAL SHOULDER ARTHROPLASTY; LEFT BICEPS TENODESIS    Surgeon(s):  Joaquín Quach MD Sajadi, Sundar Brownlee MD    Anesthesia: General with Block    Staff:   Circulator: Debbie Serrano RN; Kaelyn Simons RN  Scrub Person: Fifi Nielson  Vendor Representative: German Melchor  Nursing Assistant: Sharmila Marie         Estimated Blood Loss: 50 mL    Urine Voided: * No values recorded between 1/4/2021  7:24 AM and 1/4/2021  8:54 AM *    Specimens:                None          Drains: * No LDAs found *    Findings: per dictation    Complications: none          Sundar Miranda MD     Date: 1/4/2021  Time: 08:54 EST

## 2021-01-04 NOTE — ANESTHESIA POSTPROCEDURE EVALUATION
Patient: Nhi Hager    Procedure Summary     Date: 01/04/21 Room / Location:  [facility] /  [facility]    Anesthesia Start: 0725 Anesthesia Stop:     Procedure: TOTAL SHOULDER ARTHROPLASTY; LEFT BICEPS TENODESIS (Left Shoulder) Diagnosis:       Primary osteoarthritis, left shoulder      Left bicipital tenosynovitis      (Primary osteoarthritis, left shoulder [M19.012])    Surgeon: Sundar Miranda MD Provider: Denis Bonilla MD    Anesthesia Type: general with block ASA Status: 2          Anesthesia Type: general with block    Vitals  No vitals data found for the desired time range.    /62  RR 14  T 97.5F   ST  SpO2% 98%      Post Anesthesia Care and Evaluation    Patient location during evaluation: PACU  Patient participation: complete - patient participated  Level of consciousness: awake and alert  Pain management: adequate  Airway patency: patent  Anesthetic complications: No anesthetic complications  PONV Status: none  Cardiovascular status: hemodynamically stable and acceptable  Respiratory status: nonlabored ventilation, acceptable and nasal cannula  Hydration status: acceptable

## 2021-01-04 NOTE — PROGRESS NOTES
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 His plan is to return home.wife can assist as needed. OT eval is complete and they have provided instructions. He was independent with ADL's prior to admit. He did not want post op home visit from [email or web address] No d/c or Home Health needs identified        Discharge Codes    No documentation.       Expected Discharge Date and Time     Expected Discharge Date Expected Discharge Time    Jan 4, 2021             Sonja C Kellerman, RN

## 2021-01-04 NOTE — H&P
Pre-Op H&P  Nhi Hager  [id number]  1945    Chief complaint: left shoulder pain     HPI:    Patient is a 75 y.o.male who presents today for a left total shoulder arthroplasty, possible reverse for left shoulder osteoarthritis. He notes occasional pain in his left shoulder, especially while sleeping. He denies any change in symptoms since being seen in the office.     He notes a history of CAD s/p 4 vessel bypass in 2000. He has been asymptomatic since that time. He denies chest pain or shortness of breath.      Review of Systems:  General ROS: negative for chills, fever or skin lesions;  No changes since last office visit.  Neg for recent sick exposure  Cardiovascular ROS: no chest pain or dyspnea on exertion  Respiratory ROS: no cough, shortness of breath, or wheezing    Allergies:   Allergies   Allergen Reactions   • Ciprofloxacin GI Intolerance   • Levofloxacin GI Intolerance       Home Meds:    No current facility-administered medications on file prior to encounter.      Current Outpatient Medications on File Prior to Encounter   Medication Sig Dispense Refill   • allopurinol (ZYLOPRIM) 100 MG tablet Take 200 mg by mouth Daily.     • Biotin 1000 MCG tablet Take 5,000 mcg by mouth Daily.     • cholecalciferol (VITAMIN D3) 25 MCG (1000 UT) tablet Take 2,000 Units by mouth Daily.     • fluticasone (FLONASE) 50 MCG/ACT nasal spray 2 sprays into the nostril(s) as directed by provider Daily.     • losartan (COZAAR) 25 MG tablet Take 50 mg by mouth Daily.     • naproxen (NAPROSYN) 500 MG tablet Take 500 mg by mouth As Needed for Mild Pain .     • omeprazole (priLOSEC) 20 MG capsule Take 20 mg by mouth Daily.     • oxybutynin XL (DITROPAN-XL) 5 MG 24 hr tablet Take 5 mg by mouth Daily.     • vitamin C (ASCORBIC ACID) 500 MG tablet Take 500 mg by mouth Daily.     • CBD (cannabidiol) oral oil Take 1 drop by mouth Daily.     • metFORMIN (GLUCOPHAGE) 500 MG tablet Take 500 mg by mouth 2 (Two) Times a Day With  "Meals.     • simvastatin (ZOCOR) 80 MG tablet Take 40 mg by mouth Every Night.         PMH:   Past Medical History:   Diagnosis Date   • Arthritis    • Balance problem     vertigo   • Decreased lung capacity    • Diabetes mellitus (CMS/HCC)     checks sugar once per week    • GERD (gastroesophageal reflux disease)    • Gout    • Hepatitis A    • History of transfusion     NO REACTION    • Hyperlipidemia    • Hypertension    • Premature ventricular contractions (PVCs) (VPCs)     OCCA   • Vertigo     h/o   • Wears glasses      PSH:    Past Surgical History:   Procedure Laterality Date   • ACHILLES TENDON SURGERY      right    • CATARACT EXTRACTION      bilat    • COLONOSCOPY     • CORONARY ARTERY BYPASS GRAFT      4 vessles    • HERNIA REPAIR      bilat inguinal   • JOINT REPLACEMENT      bila hip    • SHOULDER ARTHROTOMY      bilat    • SINUS SURGERY      just cleaned out    • TONSILLECTOMY     • TOTAL SHOULDER ARTHROPLASTY W/ DISTAL CLAVICLE EXCISION Right 12/2/2019    Procedure: REVERSE TOTAL SHOULDER ARTHROPLASTY RIGHT;  Surgeon: Sundar Miranda MD;  Location: [facility];  Service: Orthopedics   • VASECTOMY     • WISDOM TOOTH EXTRACTION           Social History:   Tobacco:   Social History     Tobacco Use   Smoking Status Never Smoker   Smokeless Tobacco Never Used      Alcohol:     Social History     Substance and Sexual Activity   Alcohol Use Yes   • Frequency: Never    Comment: social        Vitals:           /76 (BP Location: Right arm, Patient Position: Lying)   Pulse 69   Temp 97.9 °F (36.6 °C) (Temporal)   Resp 18   Ht 172.7 cm (68\")   Wt 77.1 kg (170 lb)   SpO2 99%   BMI 25.85 kg/m²     Physical Exam:  General Appearance:    Alert, cooperative, no distress, appears stated age   Head:    Normocephalic, without obvious abnormality, atraumatic   Lungs:     Clear to auscultation bilaterally, respirations unlabored    Heart:   Regular rate and rhythm, S1 and S2 normal, no murmur, rub    or " gallop           Genitalia:    deferred   Extremities:   Extremities normal, atraumatic, no cyanosis or edema   Skin:   Skin color, texture, turgor normal, no rashes or lesions   Neurologic:   Grossly intact   Results Review  LABS:  Lab Results   Component Value Date    WBC 6.82 12/22/2020    HGB 12.5 (L) 12/22/2020    HCT 39.0 12/22/2020    MCV 98.2 (H) 12/22/2020     12/22/2020    NEUTROABS 14.23 (H) 12/03/2019    GLUCOSE 158 (H) 12/22/2020    BUN 39 (H) 12/22/2020    CREATININE 1.96 (H) 12/22/2020    EGFRIFNONA 33 (L) 12/22/2020     12/22/2020    K 4.6 12/22/2020     12/22/2020    CO2 24.0 12/22/2020    CALCIUM 9.6 12/22/2020       RADIOLOGY:  No radiology results for the last 3 days     I reviewed the patient's new clinical results.    Impression:   1. Left shoulder osteoarthritis     Plan:   1. Left total shoulder arthroplasty, possible reverse   Please see office note for details.     DAVID Lyles   1/4/2021   07:15 EST

## 2021-01-04 NOTE — ANESTHESIA PROCEDURE NOTES
Peripheral Block      Patient reassessed immediately prior to procedure    Patient location during procedure: pre-op  Reason for block: at surgeon's request and post-op pain management  Performed by  CRNA: Gena Perry CRNA  Assisted by: Vasu Brito CRNA  Preanesthetic Checklist  Completed: patient identified, site marked, surgical consent, pre-op evaluation, timeout performed, IV checked, risks and benefits discussed and monitors and equipment checked  Prep:  Sterile barriers:cap, gloves, mask and sterile barriers  Prep: ChloraPrep  Patient monitoring: blood pressure monitoring, continuous pulse oximetry and EKG  Procedure  Sedation:yes  Performed under: local infiltration  Guidance:ultrasound guided  Images:still images obtained, printed/placed on chart    Block Type:interscalene  Injection Technique:catheter  Needle Type:Tuohy and echogenic  Needle Gauge:18 G  Resistance on Injection: none  Catheter Size:20 G (20g)  Cath Depth at skin: 8 cm    Medications Used: fentaNYL citrate (PF) (SUBLIMAZE) injection, 100 mcg  bupivacaine PF (MARCAINE) 0.25 % injection, 15 mL  Med admintered at 1/4/2021 7:06 AM      Post Assessment  Injection Assessment: negative aspiration for heme, no paresthesia on injection and incremental injection  Patient Tolerance:comfortable throughout block  Complications:no  Additional Notes  Procedure:                 The pt was placed in semifowlers position with a slight tilt of the thorax contralateral to the insertion site.  The Insertion Site was prepped and draped in sterile fashion.  The pt was anesthetized with  IV Sedation( see meds) and  Skin and cutaneous tissue was infiltrated and anesthetized with 1% Lidocaine 3 mls via a 25g needle.  Utilizing ultrasound guidance, a BBraun 2 inch 18 g Contiplex echogenic touhy needle was advanced in-plane.  Hydro dissection of tissue was achieved with Normal saline. Major vessels(carotid and Internal Jugular) where visualized as the  brachial plexus was approached at the approximate level of C-7/ T-1.  Cervical 5 and Branches of Cervical 6 nerve roots where visualized and the needle tip was placed posterior at the level of C-6 roots.  LA spread was visualized and injection was made incrementally every 5 mls with aspiration. Injection pressure was normal or little, there was no intraneural injection, no vascular injection.      The BBraun 20 g wire stylet  catheter was then placed under US guidance on the posterior aspect of the Brachial Plexus.  Location of catheter was confirmed with NS injection visualized with US . The tuohy was then removed and the skin was sealed with Skin AFix at catheter insertion site.  Skin was prepped with mastisol and the labeled catheter  was secured with steristrips and a CHG tegaderm. Thank You.

## 2021-01-04 NOTE — ANESTHESIA PREPROCEDURE EVALUATION
Anesthesia Evaluation     Patient summary reviewed and Nursing notes reviewed   no history of anesthetic complications:  NPO Solid Status: > 8 hours  NPO Liquid Status: > 8 hours           Airway   Mallampati: II  TM distance: >3 FB  Neck ROM: full  No difficulty expected  Dental      Pulmonary - normal exam   Cardiovascular - normal exam    (+) hypertension, CABG, hyperlipidemia,       Neuro/Psych  GI/Hepatic/Renal/Endo    (+)  GERD,  hepatitis, liver disease, diabetes mellitus,     Musculoskeletal     Abdominal    Substance History      OB/GYN          Other                        Anesthesia Plan    ASA 2     general with block     intravenous induction     Anesthetic plan, all risks, benefits, and alternatives have been provided, discussed and informed consent has been obtained with: patient.    Plan discussed with CRNA.

## 2021-01-04 NOTE — THERAPY DISCHARGE NOTE
Acute Care - Occupational Therapy Discharge  [facility]    Patient Name: Nhi Hager  : 1945    MRN: [id number]                              Today's Date: 2021       Admit Date: 2021    Visit Dx:     ICD-10-CM ICD-9-CM   1. Status post total replacement of left shoulder  Z96.612 V43.61     Patient Active Problem List   Diagnosis   • Status post reverse arthroplasty of right shoulder   • Essential hypertension   • Hyperlipidemia   • H/O non-insulin dependent diabetes mellitus   • Gout   • GERD without esophagitis     Past Medical History:   Diagnosis Date   • Arthritis    • Balance problem     vertigo   • Decreased lung capacity    • Diabetes mellitus (CMS/HCC)     checks sugar once per week    • GERD (gastroesophageal reflux disease)    • Gout    • Hepatitis A    • History of transfusion     NO REACTION    • Hyperlipidemia    • Hypertension    • Premature ventricular contractions (PVCs) (VPCs)     OCCA   • Vertigo     h/o   • Wears glasses      Past Surgical History:   Procedure Laterality Date   • ACHILLES TENDON SURGERY      right    • CATARACT EXTRACTION      bilat    • COLONOSCOPY     • CORONARY ARTERY BYPASS GRAFT      4 vessles    • HERNIA REPAIR      bilat inguinal   • JOINT REPLACEMENT      bila hip    • SHOULDER ARTHROTOMY      bilat    • SINUS SURGERY      just cleaned out    • TONSILLECTOMY     • TOTAL SHOULDER ARTHROPLASTY W/ DISTAL CLAVICLE EXCISION Right 2019    Procedure: REVERSE TOTAL SHOULDER ARTHROPLASTY RIGHT;  Surgeon: Sundar Miranda MD;  Location: [facility];  Service: Orthopedics   • VASECTOMY     • WISDOM TOOTH EXTRACTION       General Information     Row Name 21 1306          OT Time and Intention    Document Type  evaluation;therapy note (daily note);discharge treatment  -AR     Mode of Treatment  individual therapy;occupational therapy  -AR     Row Name 21 1307          General Information    Patient Profile Reviewed  yes  -AR     Prior Level of  Function  independent:;all household mobility;community mobility;gait;transfer;min assist:;ADL's;home management  -AR     Existing Precautions/Restrictions  left;shoulder;non-weight bearing;other (see comments) Marty marquez Ultra II sling with pillow  -AR     Row Name 01/04/21 1306          Living Environment    Lives With  spouse  -AR     Row Name 01/04/21 1306          Home Main Entrance    Number of Stairs, Main Entrance  none  -AR     Stair Railings, Main Entrance  none  -AR     Row Name 01/04/21 1306          Cognition    Orientation Status (Cognition)  oriented x 4  -AR     Row Name 01/04/21 1306          Safety Issues, Functional Mobility    Impairments Affecting Function (Mobility)  sensation/sensory awareness  -AR       User Key  (r) = Recorded By, (t) = Taken By, (c) = Cosigned By    Initials Name Provider Type    Mary Carmen Pittman OT Occupational Therapist        Mobility/ADL's     Row Name 01/04/21 1306          Bed Mobility    Bed Mobility  supine-sit;scooting/bridging  -AR     Scooting/Bridging Traverse (Bed Mobility)  supervision;verbal cues  -AR     Supine-Sit Traverse (Bed Mobility)  supervision;verbal cues  -AR     Bed Mobility, Safety Issues  decreased use of arms for pushing/pulling  -AR     Comment (Bed Mobility)  Educated pt and spouse on importance of maintaining NWB LUE AAT, reviewed safe sleeping position.  -AR     Row Name 01/04/21 1306          Transfers    Transfers  sit-stand transfer;toilet transfer  -AR     Sit-Stand Traverse (Transfers)  supervision;verbal cues  -AR     [place]verse Level (Toilet Transfer)  supervision  -AR     Row Name 01/04/21 1306          Toilet Transfer    Type (Toilet Transfer)  lateral  -AR     Row Name 01/04/21 1306          Functional Mobility    Functional Mobility- Ind. Level  supervision required  -AR     Functional Mobility-Distance (Feet)  300  -AR     Functional Mobility-Maintain WBing  able to maintain weight bearing status   -AR     Functional Mobility- Comment  Pt ambulated 300 feet on RA with no LOB instances and no issuses with desaturaion on RA. Pt was tachycardia with , nurse notified. Pt passed mobility screen, no PT needs.  -AR     Row Name 01/04/21 1306          Activities of Daily Living    43589 - OT Self Care/Mgmt Minutes  19  -AR     BADL Assessment/Intervention  bathing;upper body dressing;lower body dressing;toileting;feeding  -AR     Row Name 01/04/21 1306          Mobility    Extremity Weight-bearing Status  left upper extremity  -AR     Left Upper Extremity (Weight-bearing Status)  non weight-bearing (NWB)  -AR     Row Name 01/04/21 1306          Bathing Assessment/Intervention    Comment (Bathing)  Edcuated pt and spouse on L shoulder precautions and L axilla care to maintain, reviewed that pt may not shower until interscalene has been DC  -AR     Row Name 01/04/21 1306          Upper Body Dressing Assessment/Training    [place] Level (Upper Body Dressing)  doff;don;front opening garment;maximum assist (25% patient effort)  -AR     Position (Upper Body Dressing)  edge of bed sitting  -AR     Comment (Upper Body Dressing)  Eduated pt and spouse on L shoulder precautions, ADL retraining to maintain, sling management and care of interscalene nerve catheter during ADLs to avoid dislodgement. Post review, pt and spouse able to don/doff sling with supervision.  -AR     Row Name 01/04/21 1306          Lower Body Dressing Assessment/Training    [place] Level (Lower Body Dressing)  doff;pants/bottoms;moderate assist (50% patient effort)  -AR     Position (Lower Body Dressing)  edge of bed sitting;unsupported standing  -AR     Row Name 01/04/21 1306          Toileting Assessment/Training    [place] Level (Toileting)  toileting skills;supervision  -AR     Position (Toileting)  unsupported standing  -AR     Row Name 01/04/21 1306          Self-Feeding Assessment/Training    [place] Level (Feeding)   feeding skills;supervision;set up  -AR       User Key  (r) = Recorded By, (t) = Taken By, (c) = Cosigned By    Initials Name Provider Type    Mary Carmen Pittman OT Occupational Therapist        Obj/Interventions     Row Name 01/04/21 1306          Sensory Assessment (Somatosensory)    Sensory Assessment (Somatosensory)  left UE  -AR     Sensory Subjective Reports  numbness  -AR     Row Name 01/04/21 1306          Vision Assessment/Intervention    Visual Impairment/Limitations  WNL  -AR     Row Name 01/04/21 1306          Range of Motion Comprehensive    Comment, General Range of Motion  RUE WNL, L elbow/wrist/hand WFL  -AR     Row Name 01/04/21 1306          Strength Comprehensive (MMT)    Comment, General Manual Muscle Testing (MMT) Assessment  RUE WFL, LUE deferred  -AR     Row Name 01/04/21 1306          Shoulder (Therapeutic Exercise)    Shoulder (Therapeutic Exercise)  AROM (active range of motion);PROM (passive range of motion)  -AR     Shoulder AROM (Therapeutic Exercise)  left;scapular retraction;supine;10 repetitions  -AR     Shoulder PROM (Therapeutic Exercise)  left;flexion;extension;external rotation;internal rotation;supine;10 repetitions  -AR     Row Name 01/04/21 1306          Elbow/Forearm (Therapeutic Exercise)    Elbow/Forearm (Therapeutic Exercise)  AAROM (active assistive range of motion)  -AR     Elbow/Forearm AAROM (Therapeutic Exercise)  left;bilateral;flexion;supination;pronation;10 repetitions;supine  -AR     Row Name 01/04/21 1306          Wrist (Therapeutic Exercise)    Wrist (Therapeutic Exercise)  AROM (active range of motion)  -AR     Wrist AROM (Therapeutic Exercise)  left;flexion;extension;10 repetitions  -AR     Row Name 01/04/21 1306          Hand (Therapeutic Exercise)    Hand (Therapeutic Exercise)  AROM (active range of motion)  -AR     Hand AROM/AAROM (Therapeutic Exercise)  left;finger flexion;finger extension;10 repetitions  -AR     Row Name 01/04/21 1306          Balance     Balance Assessment  sitting static balance;sitting dynamic balance;standing static balance;standing dynamic balance  -AR     Static Sitting Balance  WNL;sitting, edge of bed  -AR     Dynamic Sitting Balance  WNL;sitting, edge of bed  -AR     Static Standing Balance  WNL;unsupported;standing  -AR     Dynamic Standing Balance  WNL;unsupported;standing  -AR     Row Name 01/04/21 1306          Therapeutic Exercise    Therapeutic Exercise  shoulder;elbow/forearm;wrist;hand  -AR       User Key  (r) = Recorded By, (t) = Taken By, (c) = Cosigned By    Initials Name Provider Type    Mary Carmen Pittman, VISHAL Occupational Therapist        Goals/Plan     Row Name 01/04/21 1306          Bed Mobility Goal 1 (OT)    Activity/Assistive Device (Bed Mobility Goal 1, OT)  supine to sit  -AR     [place] Level/Cues Needed (Bed Mobility Goal 1, OT)  supervision required;verbal cues required  -AR     Time Frame (Bed Mobility Goal 1, OT)  short term goal (STG);1 day  -AR     Progress/Outcomes (Bed Mobility Goal 1, OT)  goal met  -AR     Row Name 01/04/21 1306          Transfer Goal 1 (OT)    Activity/Assistive Device (Transfer Goal 1, OT)  sit-to-stand/stand-to-sit;toilet  -AR     [place] Level/Cues Needed (Transfer Goal 1, OT)  supervision required;verbal cues required  -AR     Time Frame (Transfer Goal 1, OT)  short term goal (STG);1 day  -AR     Progress/Outcome (Transfer Goal 1, OT)  goal met  -AR     Row Name 01/04/21 1306          Dressing Goal 1 (OT)    Activity/Device (Dressing Goal 1, OT)  other (see comments) Pt and spouse will don/doff sling with supervision  -AR     Time Frame (Dressing Goal 1, OT)  short term goal (STG);1 day  -AR     Progress/Outcome (Dressing Goal 1, OT)  goal met  -AR     Row Name 01/04/21 1306          ROM Goal 1 (OT)    ROM Goal 1 (OT)  Pt and spouse will complete LUE HEP within physician parameters with supervision  -AR     Time Frame (ROM Goal 1, OT)  short term goal (STG);1 day  -AR      Progress/Outcome (ROM Goal 1, OT)  goal met  -AR     Row Name 01/04/21 1306          Therapy Assessment/Plan (OT)    Planned Therapy Interventions (OT)  BADL retraining;IADL retraining;occupation/activity based interventions;orthotic fabrication/fitting/training;passive ROM/stretching;patient/caregiver education/training;ROM/therapeutic exercise;transfer/mobility retraining  -AR       User Key  (r) = Recorded By, (t) = Taken By, (c) = Cosigned By    Initials Name Provider Type    Mary Carmen Pittman, OT Occupational Therapist        Clinical Impression     Row Name 01/04/21 1306          Pain Assessment    Additional Documentation  Pain Scale: Numbers Pre/Post-Treatment (Group)  -AR     [facility] Name 01/04/21 1306          Pain Scale: Numbers Pre/Post-Treatment    Pretreatment Pain Rating  0/10 - no pain  -AR     Posttreatment Pain Rating  0/10 - no pain  -AR     Row Name 01/04/21 1306          Plan of Care Review    Plan of Care Reviewed With  patient;spouse  -AR     Outcome Summary  Interscalene infusing at 6, no c/o pain. Pt tolerated L shoulder PROM , IR chest/ER 30 with good teachback from spouse. Pt ambulated 300 feet on RA and no issues with desaturation or LOB. Pt passed mobility screen, no PT needs. Recommend DC home with family assist. Issued rehab department phone number in case questions arise.  -AR     Row Name 01/04/21 1306          Therapy Assessment/Plan (OT)    Rehab Potential (OT)  good, to achieve stated therapy goals  -AR     Criteria for Skilled Therapeutic Interventions Met (OT)  yes  -AR     Therapy Frequency (OT)  evaluation only  -AR     Row Name 01/04/21 1306          Therapy Plan Review/Discharge Plan (OT)    Anticipated Discharge Disposition (OT)  home with assist  -AR     Row Name 01/04/21 1306          Vital Signs    Pre Systolic BP Rehab  135  -AR     Pre Treatment Diastolic BP  62  -AR     Post Systolic BP Rehab  129  -AR     Post Treatment Diastolic BP  86  -AR     Pretreatment  Heart Rate (beats/min)  90  -AR     Intratreatment Heart Rate (beats/min)  134  -AR     Posttreatment Heart Rate (beats/min)  100  -AR     Pre SpO2 (%)  94  -AR     O2 Delivery Pre Treatment  room air  -AR     Intra SpO2 (%)  97  -AR     O2 Delivery Intra Treatment  room air  -AR     Post SpO2 (%)  94  -AR     O2 Delivery Post Treatment  room air  -AR     Pre Patient Position  Supine  -AR     Intra Patient Position  Standing  -AR     Post Patient Position  Sitting  -AR     Row Name 01/04/21 1306          Positioning and Restraints    Pre-Treatment Position  in bed  -AR     Post Treatment Position  chair  -AR     In Chair  notified nsg;reclined;call light within reach;encouraged to call for assist;with family/caregiver;with brace RN deferred exit alarm  -AR       User Key  (r) = Recorded By, (t) = Taken By, (c) = Cosigned By    Initials Name Provider Type    Mary Carmen Pittman OT Occupational Therapist        Outcome Measures     Row Name 01/04/21 1306          How much help from another is currently needed...    Putting on and taking off regular lower body clothing?  2  -AR     Bathing (including washing, rinsing, and drying)  3  -AR     Toileting (which includes using toilet bed pan or urinal)  3  -AR     Putting on and taking off regular upper body clothing  2  -AR     Taking care of personal grooming (such as brushing teeth)  3  -AR     Eating meals  3  -AR     AM-PAC 6 Clicks Score (OT)  16  -AR     Row Name 01/04/21 1306          Functional Assessment    Outcome Measure Options  AM-PAC 6 Clicks Daily Activity (OT)  -AR       User Key  (r) = Recorded By, (t) = Taken By, (c) = Cosigned By    Initials Name Provider Type    Mary Carmen Pittman OT Occupational Therapist        Occupational Therapy Education                 Title: PT OT SLP Therapies (In Progress)     Topic: Occupational Therapy (Done)     Point: ADL training (Done)     Description:   Instruct learner(s) on proper safety adaptation and  remediation techniques during self care or transfers.   Instruct in proper use of assistive devices.              Learning Progress Summary           Patient Eager, E,TB,D,H, DU,VU by AR at 1/4/2021 1306   Family Eager, E,TB,D,H, DU,VU by AR at 1/4/2021 1306                   Point: Home exercise program (Done)     Description:   Instruct learner(s) on appropriate technique for monitoring, assisting and/or progressing therapeutic exercises/activities.              Learning Progress Summary           Patient Eager, E,TB,D,H, DU,VU by AR at 1/4/2021 1306   Family Eager, E,TB,D,H, DU,VU by AR at 1/4/2021 1306                   Point: Precautions (Done)     Description:   Instruct learner(s) on prescribed precautions during self-care and functional transfers.              Learning Progress Summary           Patient Eager, E,TB,D,H, DU,VU by AR at 1/4/2021 1306   Family Eager, E,TB,D,H, DU,VU by AR at 1/4/2021 1306                   Point: Body mechanics (Done)     Description:   Instruct learner(s) on proper positioning and spine alignment during self-care, functional mobility activities and/or exercises.              Learning Progress Summary           Patient Eager, E,TB,D,H, DU,VU by AR at 1/4/2021 1306   Family Eager, E,TB,D,H, DU,VU by AR at 1/4/2021 1306                               User Key     Initials Effective Dates Name Provider Type Discipline    AR 06/22/15 -  Mary Carmen Stover OT Occupational Therapist OT              OT Recommendation and Plan  Retired Outcome Summary/Treatment Plan (OT)  Anticipated Discharge Disposition (OT): home with assist  Planned Therapy Interventions (OT): BADL retraining, IADL retraining, occupation/activity based interventions, orthotic fabrication/fitting/training, passive ROM/stretching, patient/caregiver education/training, ROM/therapeutic exercise, transfer/mobility retraining  Therapy Frequency (OT): evaluation only  Plan of Care Review  Plan of Care Reviewed With:  patient, spouse  Outcome Summary: Interscalene infusing at 6, no c/o pain. Pt tolerated L shoulder PROM , IR chest/ER 30 with good teachback from spouse. Pt ambulated 300 feet on RA and no issues with desaturation or LOB. Pt passed mobility screen, no PT needs. Recommend DC home with family assist. Issued rehab department phone number in case questions arise.  Plan of Care Reviewed With: patient, spouse  Outcome Summary: Interscalene infusing at 6, no c/o pain. Pt tolerated L shoulder PROM , IR chest/ER 30 with good teachback from spouse. Pt ambulated 300 feet on RA and no issues with desaturation or LOB. Pt passed mobility screen, no PT needs. Recommend DC home with family assist. Issued rehab department phone number in case questions arise.     Time Calculation:   Time Calculation- OT     Row Name 01/04/21 1306             Time Calculation- OT    OT Start Time  1306  -AR      OT Received On  01/04/21  -AR      OT Goal Re-Cert Due Date  01/04/21  -AR         Timed Charges    34827 - OT Therapeutic Exercise Minutes  18  -AR      05558 - OT Therapeutic Activity Minutes  8  -AR      64362 - OT Self Care/Mgmt Minutes  19  -AR        User Key  (r) = Recorded By, (t) = Taken By, (c) = Cosigned By    Initials Name Provider Type    Mary Carmen Pittman OT Occupational Therapist        Therapy Charges for Today     Code Description Service Date Service Provider Modifiers Qty    81326690617 HC OT THER PROC EA 15 MIN 1/4/2021 Mary Carmen Stover OT GO 1    60628434745 HC OT THERAPEUTIC ACT EA 15 MIN 1/4/2021 Mary Carmen Stover OT GO 1    84459256928 HC OT SELF CARE/MGMT/TRAIN EA 15 MIN 1/4/2021 Mary Carmen Stover OT GO 1    55996926486 HC OT EVAL LOW COMPLEXITY 4 1/4/2021 Mary Carmen Stover OT GO 1               Mary Carmen Stover OT  1/4/2021

## 2021-01-05 NOTE — PROGRESS NOTES
MERY Aragon    Nerve Cath Post Op Call    Patient Name: Nhi Hager  :  1945  MRN:  [id number]  Date of Discharge: 2021    Nerve Cath Post Op Call:    Analgesia:Good  Side Effects:None  Catheter Site:clean  Patient Controlled ON Q pump infusion rate: 6ml/hr  Catheter Plan:Will continue with plan at home without changes and The patient was instructed to call ON CALL Anesthesia provider for any questions or problems  Patient/Family instructed to call ON CALL anesthesia provider for any questions or problems.  Patient Follow Up:    Tylenol use: acetaminophen use

## 2021-01-06 NOTE — PROGRESS NOTES
MERY Aragon    Nerve Cath Post Op Call    Patient Name: Nhi Hager  :  1945  MRN:  [id number]  Date of Discharge: 2021    Nerve Cath Post Op Call:    Analgesia:Good  Pain Score:0/10  Side Effects:None  Catheter Site:clean  Patient Controlled ON Q pump infusion rate: 6ml/hr  Catheter Plan:Will continue with plan at home without changes and The patient was instructed to call ON CALL Anesthesia provider for any questions or problems  Patient/Family instructed to call ON CALL anesthesia provider for any questions or problems.  Patient Follow Up:  Patient provided the educational video about the nerve catheter and pain pump.  Video adequately prepared patient to manage pain pump at home.

## 2021-01-07 NOTE — PROGRESS NOTES
MERY Aragon    Nerve Cath Post Op Call    Patient Name: Nhi Hager  :  1945  MRN:  [id number]  Date of Discharge: 2021    Nerve Cath Post Op Call:    Catheter Plan:Patient called, No answer. Message left to call [facility] pain service for any questions or complaints and Patient/Family member instructed to remove the catheter during telephone contact  Patient/Family instructed to call ON CALL anesthesia provider for any questions or problems.  Patient Follow Up:

## 2021-01-08 NOTE — PROGRESS NOTES
MERY Aragon    Nerve Cath Post Op Call    Patient Name: Nhi Hager  :  1945  MRN:  [id number]  Date of Discharge: 2021    Nerve Cath Post Op Call:    Catheter Plan:Patient called, No answer. Message left to call [facility] pain service for any questions or complaints  Patient/Family instructed to call ON CALL anesthesia provider for any questions or problems.  Patient Follow Up:

## 2021-01-08 NOTE — PROGRESS NOTES
Mahesh    Nerve Cath Post Op Call    Patient Name: Nhi Hager  :  1945  MRN:  [id number]  Date of Discharge: 2021    Treatment Plan    Pt called, left message, took out catheter, doing well.  Thank you

## 2023-05-01 ENCOUNTER — OFFICE VISIT (OUTPATIENT)
Dept: NEUROSURGERY | Facility: CLINIC | Age: 78
End: 2023-05-01
Payer: MEDICARE

## 2023-05-01 VITALS — BODY MASS INDEX: 25.11 KG/M2 | WEIGHT: 160 LBS | HEIGHT: 67 IN

## 2023-05-01 DIAGNOSIS — M48.061 SPINAL STENOSIS, LUMBAR REGION, WITHOUT NEUROGENIC CLAUDICATION: Primary | ICD-10-CM

## 2023-05-01 DIAGNOSIS — M54.50 CHRONIC RIGHT-SIDED LOW BACK PAIN WITHOUT SCIATICA: ICD-10-CM

## 2023-05-01 DIAGNOSIS — M25.551 RIGHT HIP PAIN: ICD-10-CM

## 2023-05-01 DIAGNOSIS — G89.29 CHRONIC RIGHT-SIDED LOW BACK PAIN WITHOUT SCIATICA: ICD-10-CM

## 2023-05-01 PROCEDURE — 99203 OFFICE O/P NEW LOW 30 MIN: CPT | Performed by: NEUROLOGICAL SURGERY

## 2023-05-01 NOTE — PROGRESS NOTES
NAME: MAXIMINO ELLIS   DOS: 2023  : 1945  PCP: Denis Tamez MD    Chief Complaint:    Chief Complaint   Patient presents with   • Back Pain       History of Present Illness:  77 y.o. male   Is a 77-year-old male neurosurgical consultation he presents with a history of on again off again right-sided hip pain he is gotten therapy for it in the past    He reports a recent injury where he stepped down off of a horse on his right heel about 3 days later he experienced some onset of right-sided sciatic and neurologic pain the pain is intermittent in nature it is positional at nighttime it is interrupting sleep it radiates into relatively L4 type to L5 pattern down to the posterior hamstring the knee and lateral aspect of the foot there is some eccentricity's about the pain for example standing on the right foot alone hurts denies any neurogenic claudication and does not have a straight leg raise sign is here for evaluation doing therapy quite fit has a significant cardiac history does have a history of kidney issues    PMHX  Allergies:  Allergies   Allergen Reactions   • Ciprofloxacin GI Intolerance   • Levofloxacin GI Intolerance     Medications    Current Outpatient Medications:   •  acetaminophen (TYLENOL) 500 MG tablet, Take 1 tablet by mouth Every 6 (Six) Hours As Needed for Mild Pain., Disp: , Rfl:   •  allopurinol (ZYLOPRIM) 100 MG tablet, Take 2 tablets by mouth Daily., Disp: , Rfl:   •  aspirin 81 MG EC tablet, Take 1 tablet by mouth Daily As Needed for Mild Pain. OR 325MG, Disp: , Rfl:   •  fluticasone (FLONASE) 50 MCG/ACT nasal spray, 2 sprays into the nostril(s) as directed by provider Daily., Disp: , Rfl:   •  glipizide (GLUCOTROL XL) 5 MG ER tablet, Take 1 tablet by mouth Daily., Disp: , Rfl:   •  losartan (COZAAR) 25 MG tablet, Take 2 tablets by mouth Daily., Disp: , Rfl:   •  omeprazole (priLOSEC) 20 MG capsule, Take 1 capsule by mouth Daily., Disp: , Rfl:   •  oxybutynin XL  (DITROPAN-XL) 5 MG 24 hr tablet, Take 1 tablet by mouth Daily., Disp: , Rfl:   •  rosuvastatin (CRESTOR) 40 MG tablet, Take 1 tablet by mouth Daily., Disp: , Rfl:   •  Biotin 1000 MCG tablet, Take 5,000 mcg by mouth Daily., Disp: , Rfl:   •  CBD (cannabidiol) oral oil, Take 1 drop by mouth Daily., Disp: , Rfl:   •  cholecalciferol (VITAMIN D3) 25 MCG (1000 UT) tablet, Take 2,000 Units by mouth Daily., Disp: , Rfl:   •  metFORMIN (GLUCOPHAGE) 500 MG tablet, Take 500 mg by mouth 2 (Two) Times a Day With Meals., Disp: , Rfl:   •  naproxen (NAPROSYN) 500 MG tablet, Take 500 mg by mouth As Needed for Mild Pain ., Disp: , Rfl:   •  oxyCODONE (ROXICODONE) 5 MG immediate release tablet, Take 1 tablet by mouth Every 4 (Four) Hours As Needed for Moderate Pain ., Disp: 25 tablet, Rfl: 0  •  oxyCODONE-acetaminophen (PERCOCET) 5-325 MG per tablet, Take 1 tablet by mouth Every 6 (Six) Hours As Needed., Disp: , Rfl:   •  simvastatin (ZOCOR) 80 MG tablet, Take 40 mg by mouth Every Night., Disp: , Rfl:   •  Turmeric 450 MG capsule, Take 1 capsule by mouth Daily., Disp: , Rfl:   •  vitamin C (ASCORBIC ACID) 500 MG tablet, Take 500 mg by mouth Daily., Disp: , Rfl:   Past Medical History:  Past Medical History:   Diagnosis Date   • Abnormal ECG 2000   • Anemia 2022   • Arthritis    • Balance problem     vertigo   • Coronary artery disease 2000   • Decreased lung capacity    • Diabetes mellitus     checks sugar once per week    • GERD (gastroesophageal reflux disease)    • Gout    • Hepatitis A    • History of transfusion     NO REACTION    • Hyperlipidemia    • Hypertension    • Premature ventricular contractions (PVCs) (VPCs)     OCCA   • Vertigo     h/o   • Wears glasses      Past Surgical History:  Past Surgical History:   Procedure Laterality Date   • ACHILLES TENDON SURGERY      right    • CATARACT EXTRACTION      bilat    • COLONOSCOPY     • CORONARY ARTERY BYPASS GRAFT      4 vessles    • HERNIA REPAIR      bilat inguinal   •  JOINT REPLACEMENT      bila hip    • SHOULDER ARTHROTOMY      bilat    • SINUS SURGERY      just cleaned out    • TONSILLECTOMY     • TOTAL SHOULDER ARTHROPLASTY Left 01/04/2021    Procedure: TOTAL SHOULDER ARTHROPLASTY, LEFT BICEPS TENODESIS;  Surgeon: Sundar Miranda MD;  Location: [facility] OR;  Service: Orthopedics;  Laterality: Left;   • TOTAL SHOULDER ARTHROPLASTY W/ DISTAL CLAVICLE EXCISION Right 12/02/2019    Procedure: REVERSE TOTAL SHOULDER ARTHROPLASTY RIGHT;  Surgeon: Sundar Miranda MD;  Location: [facility] OR;  Service: Orthopedics   • VASCULAR SURGERY  Heart 2000   • VASECTOMY     • WISDOM TOOTH EXTRACTION       Social Hx:  Social History     Tobacco Use   • Smoking status: Never   • Smokeless tobacco: Never   Vaping Use   • Vaping Use: Never used   Substance Use Topics   • Alcohol use: Yes     Alcohol/week: 12.0 standard drinks     Types: 2 Glasses of wine, 10 Cans of beer per week     Comment: social    • Drug use: No     Family Hx:  Family History   Problem Relation Age of Onset   • Hyperlipidemia Mother    • Hypertension Mother    • Hyperlipidemia Brother    • Hypertension Brother      Review of Systems:        Review of Systems   Constitutional: Negative.    HENT: Negative.    Eyes: Negative.    Respiratory: Negative.    Cardiovascular: Negative.    Gastrointestinal: Negative.    Endocrine: Negative.    Genitourinary: Negative.    Musculoskeletal: Positive for back pain.        RLE   Skin: Negative.    Allergic/Immunologic: Negative.    Neurological: Negative.    Hematological: Negative.    Psychiatric/Behavioral: Negative.    All other systems reviewed and are negative.       I have reviewed this note template and all pertinent parts of the review of systems social, family history, surgical history and medication list    Physical Examination:  There were no vitals filed for this visit.   General Appearance:   Well developed, well nourished, well groomed, alert, and  cooperative.  Neurological examination:  Neurologic Exam  Vital signs were reviewed and documented in the chart  Patient appeared in good neurologic function with normal comprehension fluent speech  Mood and affect appear normal    grossly speech, facial symmetry and cranial exam appear wnl    Muscle bulk and tone normal LE bilaterals  5 out of 5 strength no motor drift  Gait normal intact  Negative Romberg  No clonus long tract signs or myelopathy     Reflexes symmetrically absent, decreased vibratory sensation bilaterally  No ankle or arm  edema noted and extremities skin appears normal for age    Mild SI joint reproduction of pain on the right  Straight leg raise sign absent  No signs of intrinsic hip dysfunction  Back is without any lesions or abnormality  Feet are warm and well perfused        Review of Imaging/DATA:  No imaging  Diagnoses/Plan:    Mr. Hager is a 77 y.o. male   1.  Right-sided hip and hamstring pain and overtones of pseudo sciatica along with sciatica history of complex hip issues SI joint and repetitive flareups of this issue but this never this bad    Leading diagnosis would be a sciatic ruptured disc etc. from a neurosurgical standpoint I think it is reasonable to consider continuation of physical therapy    MRI right hip to check for posterior muscular SI joint in the light given some of the pseudoradicular components of this perhaps looking at the piriformis if it is available for review despite his hip replacement    Lumbar flexion-extension films also back afterwards    I will make him a referral to one of our interventional pain management docs for potentially lumbar epidural steroid block

## 2023-05-11 ENCOUNTER — TELEPHONE (OUTPATIENT)
Dept: NEUROSURGERY | Facility: CLINIC | Age: 78
End: 2023-05-11
Payer: MEDICARE

## 2023-05-11 NOTE — TELEPHONE ENCOUNTER
PATIENT CALLED IN AND STATES HE CALLED DR. PARR THIS MORNING AND DR. PARR WAS GOING TO SEND IN A STEROID IN FOR HIM BUT HIS PHARMACY HAS NOT RECEIVED THE PRESCRIPTION.    STATES HE IS A FRIEND OF DR. PARR AND THIS WAS ON HIS PERSONAL CELL.     PATIENT ALSO STATES DR. PARR TOLD HIM TO COME INTO THE OFFICE AFTER HIS MRI ON 5/20/23 - MY FIRST AVAILABLE IS 6/1/23 AND PATIENT DOES NOT WANT TO WAIT TILL June.       PLEASE CALL PATIENT WITH ANY ADVICE.     THANK YOU!

## 2023-05-11 NOTE — TELEPHONE ENCOUNTER
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     Patient was thankful for the return call.     Thanks.

## 2023-05-16 ENCOUNTER — TELEPHONE (OUTPATIENT)
Dept: NEUROSURGERY | Facility: CLINIC | Age: 78
End: 2023-05-16
Payer: MEDICARE

## 2023-05-16 NOTE — TELEPHONE ENCOUNTER
Documentation Only: I have tried to send a PA in but it will not go through, I have called and talked to  Eddie at [facility] and she tried as well with no luck.  I called [facility] and talked to Iron he looked in to it and the pharmacy split it up so the patient would not have to wait on a PA.

## 2023-05-20 ENCOUNTER — HOSPITAL ENCOUNTER (OUTPATIENT)
Dept: MRI IMAGING | Facility: HOSPITAL | Age: 78
Discharge: HOME | End: 2023-05-20
Payer: MEDICARE

## 2023-05-20 DIAGNOSIS — M54.50 CHRONIC RIGHT-SIDED LOW BACK PAIN WITHOUT SCIATICA: ICD-10-CM

## 2023-05-20 DIAGNOSIS — M25.551 RIGHT HIP PAIN: ICD-10-CM

## 2023-05-20 DIAGNOSIS — M48.061 SPINAL STENOSIS, LUMBAR REGION, WITHOUT NEUROGENIC CLAUDICATION: ICD-10-CM

## 2023-05-20 DIAGNOSIS — G89.29 CHRONIC RIGHT-SIDED LOW BACK PAIN WITHOUT SCIATICA: ICD-10-CM

## 2023-05-20 PROCEDURE — 72148 MRI LUMBAR SPINE W/O DYE: CPT

## 2023-05-20 PROCEDURE — 73721 MRI JNT OF LWR EXTRE W/O DYE: CPT

## 2023-05-22 ENCOUNTER — OFFICE VISIT (OUTPATIENT)
Dept: NEUROSURGERY | Facility: CLINIC | Age: 78
End: 2023-05-22
Payer: MEDICARE

## 2023-05-22 DIAGNOSIS — M54.41 ACUTE RIGHT-SIDED LOW BACK PAIN WITH BILATERAL SCIATICA: Primary | ICD-10-CM

## 2023-05-22 DIAGNOSIS — M54.42 ACUTE RIGHT-SIDED LOW BACK PAIN WITH BILATERAL SCIATICA: Primary | ICD-10-CM

## 2023-05-22 PROCEDURE — 99212 OFFICE O/P EST SF 10 MIN: CPT | Performed by: NEUROLOGICAL SURGERY

## 2023-05-31 ENCOUNTER — OFFICE VISIT (OUTPATIENT)
Dept: PAIN MEDICINE | Facility: CLINIC | Age: 78
End: 2023-05-31

## 2023-05-31 VITALS
HEART RATE: 64 BPM | DIASTOLIC BLOOD PRESSURE: 74 MMHG | HEIGHT: 67 IN | RESPIRATION RATE: 16 BRPM | TEMPERATURE: 96.9 F | WEIGHT: 161.4 LBS | SYSTOLIC BLOOD PRESSURE: 118 MMHG | BODY MASS INDEX: 25.33 KG/M2 | OXYGEN SATURATION: 97 %

## 2023-05-31 DIAGNOSIS — M54.16 LUMBAR RADICULOPATHY: Primary | ICD-10-CM

## 2023-05-31 PROCEDURE — 3078F DIAST BP <80 MM HG: CPT | Performed by: STUDENT IN AN ORGANIZED HEALTH CARE EDUCATION/TRAINING PROGRAM

## 2023-05-31 PROCEDURE — 3074F SYST BP LT 130 MM HG: CPT | Performed by: STUDENT IN AN ORGANIZED HEALTH CARE EDUCATION/TRAINING PROGRAM

## 2023-05-31 PROCEDURE — 1160F RVW MEDS BY RX/DR IN RCRD: CPT | Performed by: STUDENT IN AN ORGANIZED HEALTH CARE EDUCATION/TRAINING PROGRAM

## 2023-05-31 PROCEDURE — 1159F MED LIST DOCD IN RCRD: CPT | Performed by: STUDENT IN AN ORGANIZED HEALTH CARE EDUCATION/TRAINING PROGRAM

## 2023-05-31 RX ORDER — MULTIPLE VITAMINS W/ MINERALS TAB 9MG-400MCG
TAB ORAL
COMMUNITY

## 2023-05-31 RX ORDER — INDAPAMIDE 2.5 MG/1
TABLET, FILM COATED ORAL
COMMUNITY
Start: 2023-05-24

## 2023-05-31 RX ORDER — METOLAZONE 2.5 MG/1
TABLET ORAL EVERY 24 HOURS
COMMUNITY

## 2023-05-31 RX ORDER — AMLODIPINE BESYLATE 5 MG/1
TABLET ORAL
COMMUNITY
Start: 2023-05-16

## 2023-05-31 NOTE — PROGRESS NOTES
05/31/2023      Referring Physician: Yakov Pal MD  [address]  [address]  [place] [address]    Primary Physician: Jayce Valdez MD    CHIEF COMPLAINT or REASON FOR VISIT: Back Pain (New patient)      HISTORY OF PRESENT ILLNESS:  Mr. Nhi Hager is 77 y.o. male who presents as a new patient referral for evaluation treatment of chronic low back pain with radiation right lower extremity.  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  He states that he was in his normal state of health with moderate intermittent low back pain when he was dismounting from a horse approximately 2 months ago and suffered acute onset low back pain with radiation of the right lower extremity.  He describes numbness and tingling and pain from his right buttock down the posterior lateral aspect of his thigh and shin calf into the lateral aspect of his foot and small toe.  Patient denies any bowel or bladder dysfunction, lower extremity weakness, new onset saddle anesthesia or unexplained weight loss.   He has trialed nightly pregabalin which did cause some morning sedation.  He is engaged in physical therapy which has been helpful.  Unfortunately his pain has limited his activities of daily living, sleep, ability to ambulate.  He has additionally trialed Tylenol, NSAIDs, tramadol, Medrol Dosepak.  The Medrol Dosepak was quite helpful.  He additionally sought consultation with neurosurgery, Dr. Yakov Pal, who recommended conservative management.        Objective Pain Scoring:   BRIEF PAIN INVENTORY:  Total score:   Pain Score    05/31/23 1501   PainSc:   7   PainLoc: Back      PHQ-2: PHQ-2 Total Score: 0  PHQ-9: PHQ-9: Brief Depression Severity Measure Score: 0  Opioid Risk Tool:         Review of Systems:   ROS negative except as otherwise noted     Past Medical History:   Past Medical History:   Diagnosis Date   • Abnormal ECG 2000   • Anemia 2022   • Arthritis    •  Balance problem     vertigo   • Chronic pain disorder 6-8 mos    Variable sites and times   • Coronary artery disease 2000   • Decreased lung capacity    • Diabetes mellitus     checks sugar once per week    • GERD (gastroesophageal reflux disease)    • Gout    • Hepatitis A    • History of transfusion     NO REACTION    • Hyperlipidemia    • Hypertension    • Joint pain 1999    Hip and shoulders have been relpaced   • Low back pain Three mos or more   • Lumbosacral disc disease 6 weeks or more    SI joint pain primarily   • Osteoarthritis Ten years   • Premature ventricular contractions (PVCs) (VPCs)     OCCA   • Spinal stenosis Noted on x rays   • Vertigo     h/o   • Wears glasses          Past Surgical History:   Past Surgical History:   Procedure Laterality Date   • ACHILLES TENDON SURGERY      right    • CATARACT EXTRACTION      bilat    • COLONOSCOPY     • CORONARY ARTERY BYPASS GRAFT      4 vessles    • HERNIA REPAIR      bilat inguinal   • JOINT REPLACEMENT      bila hip    • ORTHOPEDIC SURGERY  2000    Joint and shoulder replacements   • SHOULDER ARTHROTOMY      bilat    • SINUS SURGERY      just cleaned out    • TONSILLECTOMY     • TOTAL SHOULDER ARTHROPLASTY Left 01/04/2021    Procedure: TOTAL SHOULDER ARTHROPLASTY, LEFT BICEPS TENODESIS;  Surgeon: Sundar Miranda MD;  Location:  [facility] OR;  Service: Orthopedics;  Laterality: Left;   • TOTAL SHOULDER ARTHROPLASTY W/ DISTAL CLAVICLE EXCISION Right 12/02/2019    Procedure: REVERSE TOTAL SHOULDER ARTHROPLASTY RIGHT;  Surgeon: Sundar Miranda MD;  Location:  [facility] OR;  Service: Orthopedics   • VASCULAR SURGERY  Heart 2000   • VASECTOMY     • WISDOM TOOTH EXTRACTION           Family History   Family History   Problem Relation Age of Onset   • Hyperlipidemia Mother    • Hypertension Mother    • Arthritis Mother    • Diabetes Mother    • Osteoporosis Mother    • Hyperlipidemia Brother    • Hypertension Brother    • Coronary artery disease Brother    •  Mental illness Brother    • Hyperlipidemia Father          Social History   Social History     Socioeconomic History   • Marital status:    Tobacco Use   • Smoking status: Never   • Smokeless tobacco: Never   Vaping Use   • Vaping Use: Never used   Substance and Sexual Activity   • Alcohol use: Yes     Alcohol/week: 5.0 - 6.0 standard drinks     Types: 1 - 2 Glasses of wine, 2 Cans of beer, 2 Drinks containing 0.5 oz of alcohol per week     Comment: social    • Drug use: No   • Sexual activity: Not Currently     Partners: Female     Birth control/protection: Vasectomy     Comment:         Medications:     Current Outpatient Medications:   •  acetaminophen (TYLENOL) 500 MG tablet, Take 1 tablet by mouth Every 6 (Six) Hours As Needed for Mild Pain., Disp: , Rfl:   •  allopurinol (ZYLOPRIM) 100 MG tablet, Take 2 tablets by mouth Daily., Disp: , Rfl:   •  amLODIPine (NORVASC) 5 MG tablet, , Disp: , Rfl:   •  aspirin 81 MG EC tablet, Take 1 tablet by mouth Daily As Needed for Mild Pain. OR 325MG, Disp: , Rfl:   •  glipizide (GLUCOTROL XL) 5 MG ER tablet, Take 1 tablet by mouth Daily., Disp: , Rfl:   •  indapamide (LOZOL) 2.5 MG tablet, , Disp: , Rfl:   •  metOLazone (ZAROXOLYN) 2.5 MG tablet, Daily., Disp: , Rfl:   •  multivitamin with minerals tablet tablet, multivitamin, Disp: , Rfl:   •  omeprazole (priLOSEC) 20 MG capsule, Take 1 capsule by mouth Daily., Disp: , Rfl:   •  oxybutynin XL (DITROPAN-XL) 5 MG 24 hr tablet, Take 1 tablet by mouth Daily., Disp: , Rfl:   •  rosuvastatin (CRESTOR) 40 MG tablet, Take 1 tablet by mouth Daily., Disp: , Rfl:   •  fluticasone (FLONASE) 50 MCG/ACT nasal spray, 2 sprays into the nostril(s) as directed by provider Daily. (Patient not taking: Reported on 5/31/2023), Disp: , Rfl:   •  losartan (COZAAR) 25 MG tablet, Take 2 tablets by mouth Daily., Disp: , Rfl:   •  methylPREDNISolone (MEDROL) 4 MG dose pack, Take as directed on package instructions., Disp: 21 tablet, Rfl:  "1  •  pregabalin (Lyrica) 50 MG capsule, Take 1 capsule by mouth Every Night., Disp: 30 capsule, Rfl: 0  •  traMADol (ULTRAM) 50 MG tablet, Take 1 tablet by mouth Every 6 (Six) Hours As Needed for Moderate Pain., Disp: 45 tablet, Rfl: 0        Physical Exam:     Vitals:    05/31/23 1501   BP: 118/74   BP Location: Left arm   Patient Position: Sitting   Cuff Size: Adult   Pulse: 64   Resp: 16   Temp: 96.9 °F (36.1 °C)   TempSrc: Infrared   SpO2: 97%   Weight: 73.2 kg (161 lb 6.4 oz)   Height: 170.2 cm (67\")   PainSc:   7   PainLoc: Back        General: Alert and oriented, No acute distress.   HEENT: Normocephalic, atraumatic.   Cardiovascular: No gross edema  Respiratory: Respirations are non-labored    Lumbar Spine:   No masses or atrophy  Range of motion - Flexion normal. Extension normal. Right Lat Bending normal. Left Lat Bending normal  Facet Loading: Negative bilaterally  Facet Palpation - Nontender   PSIS tenderness - Negative bilaterally  Avila's/SOCORRO/Thigh thrust - Negative bilaterally  Straight leg raise: Positive right  Slump test: Positive right    Motor Exam:    Strength: Rate on 1-5 scale Right Left    L1/2- hip flexion 5 5    L3- knee extension 5 5    L4- ankle dorsiflexion 5 5    L5- great toe extension 5 5    S1- ankle plantarflexion 5 5    Sensory Exam: Decreased sensation to light touch in right S1 dermatome.    Neurologic: Cranial Nerves II-XII are grossly intact.   Clonus -negative bilaterally    Psychiatric: Cooperative.   Gait: Antalgic  Assistive Devices: None    Imaging Studies:   Results for orders placed during the hospital encounter of 05/20/23    MRI Lumbar Spine Without Contrast    Narrative  MRI LUMBAR SPINE WO CONTRAST    Date of Exam: 5/20/2023 9:04 AM EDT    Indication: spinal stenosis.    Comparison: None available.    Technique:  Routine multiplanar/multisequence sequence images of the lumbar spine were obtained without contrast administration.    Findings:  There is diffuse " marrow hyperintensity present, suggesting demineralization, otherwise without evidence of fracture or suspicious marrow replacing lesion. Straightening is present in addition to minimal retrolisthesis of L2 on L3 and L3 on L4. The conus  medullaris and cauda equina nerve roots are satisfactory in appearance. The paraspinal soft tissues demonstrate no acute or suspicious findings. Multilevel spondylosis is present,, with contributing component of epidural lipomatosis, with areas of  involvement including    L1-2, small disc bulge and bilateral facet arthropathy. There is mild spinal canal stenosis and moderate bilateral neuroforaminal narrowing, greater on the right.    L2-3, small disc bulge and bilateral facet arthropathy. There is mild to moderate spinal canal narrowing in addition to moderate to severe left and severe right neuroforaminal narrowing.    L3-4, small disc bulge and bilateral facet arthropathy. There is moderate spinal canal stenosis in addition to severe left and moderate to severe right neuroforaminal narrowing.    L4-5, small disc bulge, bilateral facet arthropathy and component of epidural lipomatosis. There is moderate spinal canal narrowing in addition to severe left and moderate to severe right neuroforaminal stenosis.    L5-S1, small disc bulge and bilateral facet arthropathy, with contributing component of epidural lipomatosis. There is resultant moderate spinal canal narrowing in addition to severe bilateral neuroforaminal stenosis.    Impression  Impression:  Multilevel lumbar spondylosis is present, with prominent component of multilevel advanced facet arthropathy. There is resultant multilevel severe neuroforaminal narrowing present as above. Somewhat generalized moderate multilevel spinal canal stenosis is  present.    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  I personally reviewed his lumbar MRI dated 5/20/2023: Scoliosis, multilevel severe degenerative disc disease with Modic 2 endplate changes throughout lumbar spine; no significant canal stenosis; multilevel bilateral neuroforaminal stenosis.  Clinical examination most consistent with right L5, S1 radiculopathy which correlates with right-sided L5/S1 neuroforaminal and lateral recess stenosis secondary to disc herniation and juxta facet hypertrophy/cyst.  We discussed epidural steroid injection to improve pain.  If greater than 50% relief for at least 2-3 months can consider repeat as needed every 3 to 4 months.  I had an in-depth discussion with the patient regarding the risks of the procedure including bleeding, infection, damage to surrounding structures, paralysis.  We discussed the potential adverse effects of corticosteroid injection including flushing of the face, lipodystrophy, skin discoloration, elevated blood glucose, increased blood pressure.  Risks of frequent steroid administration include weight gain, hormonal changes, mood changes, osteoporosis.      1. Lumbar radiculopathy        PLAN:  1. Medication Recommendations: Recommend Voltaren topical, NSAIDs, Tylenol.  Can trial turmeric 500 mg twice daily if NSAID contraindicated.  Agree with pregabalin    2. Physical Therapy: Continue PT, traction    3. Psychological: defer    4. Complementary and alternative (CAM) Therapies:     5. Labs: None indicated     6. Imaging: MRI independently interpreted and reviewed with patient    7. Interventions: Schedule right L5/S1 and S1 foramen transforaminal epidural steroid injection (2369, 10266)    8. Referrals: None indicated     9. Records requested: n/a    10. Lifestyle goals:    Follow-up 1 - 2 months after  [facility]  Mohamud Bell MD

## 2023-06-02 ENCOUNTER — PATIENT ROUNDING (BHMG ONLY) (OUTPATIENT)
Dept: PAIN MEDICINE | Facility: CLINIC | Age: 78
End: 2023-06-02

## 2023-06-02 NOTE — PROGRESS NOTES
A MY-CHART MESSAGE HAS BEEN SENT TO THE PATIENT FOR PATIENT ROUNDING WITH [facility] PAIN MANAGEMENT.

## 2023-06-15 ENCOUNTER — DOCUMENTATION (OUTPATIENT)
Dept: PAIN MEDICINE | Facility: CLINIC | Age: 78
End: 2023-06-15

## 2023-06-15 ENCOUNTER — OUTSIDE FACILITY SERVICE (OUTPATIENT)
Dept: PAIN MEDICINE | Facility: CLINIC | Age: 78
End: 2023-06-15
Payer: MEDICARE

## 2023-06-15 NOTE — PROGRESS NOTES
[facility]  [address] [place] [address]    PROCEDURE: Fluoroscopically-guided right L5/S1 and S1 lumbar Transforaminal Epidural Steroid Injection      PRE-OP DIAGNOSIS: Lumbosacral radiculopathy  POST-OP DIAGNOSIS: Lumbosacral radiculopathy     BLOOD THINNERS (ANTIPLATELETS/ANTICOAGULANTS): Were discussed with the patient and JOY Guidelines were followed.  Not on anticoagulant at baseline.      CONSENT: Risks, benefits and options were explained to the patient, all questions were answered and written informed consent was obtained.     ANESTHESIA: Moderate sedation was required to maintain comfort, safety, and cooperation during the procedure.  The duration of sedation service was over 10 minutes.  Patient received 1mg IV Versed and 100mcg IV fentanyl.  Independent observation and monitoring was performed by Lien Zamora RN.  The patient's level of consciousness and physiologic status was continually monitored with pulse oximetry, EKG.  There were no complications or adverse events during sedation.  After the sedation patient was taken to the recovery area.       PROCEDURE NOTE: A pre-procedural time out was performed to confirm the correct patient, procedure, side, and site. Standard ASA monitors were applied and oxygen via nasal cannula was provided. All proceduralists donned sterile gloves with masks and surgical hats. The patient was placed prone with pillow under the abdomen and all pressure points padded. The patient's lumbar spine was prepped in standard fashion using Chlorhexidine and draped with sterile towels.  The L5/S1 interspace was identified in AP fluoroscopy.  Then, the C-arm was obliqued towards the symptomatic side.  The overlying skin was anesthetized with 1% lidocaine, 1 mL.  Then a 22-gauge 3-1/2 inch spinal needle was advanced to 6 o'clock position of the target pedicle.  Contact was made with os and then the needle was passed inferiorly into the  "foramen.  Position was identified on both AP and lateral fluoroscopy.  Then after negative aspiration 1 mL of Omnipaque was injected demonstrating clear outline of the target spinal nerve with medial spread to the epidural space.  There was no evidence of vascular uptake.  Then a mixture containing 1 cc 1% lidocaine preservative-free and 5 mg dexamethasone steroid was injected.  The needle was then flushed and removed.  Then, the sacral foraminae were identified using AP fluoroscopy and the superior endplate was squared with \"Carpenter\" view.  The target sacral foramen was identified.  The overlying skin and subcutaneous tissue was anesthetized with 1% lidocaine. A 22 gauge 3.5 inch spinal needle with bent tip was incrementally advanced using intermittent fluoroscopy to the lateral border of the target sacral foramen using AP fluoroscopy.  Then the needle was walked and medially until it entered the sacral foramen.  After negative aspiration of blood and cerebrospinal fluid, needle placement was confirmed with 1 ml of omnipaque 180 mgI/ml contrast using AP and lateral fluoroscopic imaging. Imaging revealed a clear outline of the target spinal nerve with proximal spread of agent through the neuroforamen to the epidural space, without evidence of intravascular or intrathecal spread. After negative aspiration, a mixture containing dexamethasone 5 mg steroid and lidocaine 1% - 2 ml local anesthetic for a total volume of 3 ml was injected under direct visualization with fluoroscopy. The needle was flushed, removed and a bandage applied.       EBL: None      COMPLICATIONS: None      The patient was monitored for at least 30 minutes prior to discharge. Vital signs remained stable throughout the procedure and in the recovery area. There were no immediate complications and the patient tolerated the procedure well. Sensory and motor exam was unchanged from baseline. The patient received written discharge instructions prior to " discharge.      FOLLOW UP: As scheduled      ADDITIONAL NOTES:            [facility]                                            Mohamud Bell MD

## 2023-07-26 PROBLEM — M48.061 SPINAL STENOSIS, LUMBAR REGION, WITHOUT NEUROGENIC CLAUDICATION: Status: ACTIVE | Noted: 2023-07-26

## 2023-07-26 PROBLEM — G89.29 CHRONIC RIGHT-SIDED LOW BACK PAIN WITHOUT SCIATICA: Status: ACTIVE | Noted: 2023-07-26

## 2023-07-26 PROBLEM — M54.41 ACUTE RIGHT-SIDED LOW BACK PAIN WITH BILATERAL SCIATICA: Status: ACTIVE | Noted: 2023-07-26

## 2023-07-26 PROBLEM — M54.42 ACUTE RIGHT-SIDED LOW BACK PAIN WITH BILATERAL SCIATICA: Status: ACTIVE | Noted: 2023-07-26

## 2023-07-26 PROBLEM — M54.50 CHRONIC RIGHT-SIDED LOW BACK PAIN WITHOUT SCIATICA: Status: ACTIVE | Noted: 2023-07-26

## 2023-07-27 ENCOUNTER — DOCUMENTATION (OUTPATIENT)
Dept: PAIN MEDICINE | Facility: CLINIC | Age: 78
End: 2023-07-27

## 2023-07-27 ENCOUNTER — OUTSIDE FACILITY SERVICE (OUTPATIENT)
Dept: PAIN MEDICINE | Facility: CLINIC | Age: 78
End: 2023-07-27
Payer: MEDICARE

## 2023-07-27 NOTE — PROGRESS NOTES
[facility]  [address] [place] [id number]      PROCEDURE: Fluoroscopically-guided Right L4/5 and L5/S1 Lumbar Transforaminal Epidural Steroid Injection     PRE-OP DIAGNOSIS: Lumbar radiculopathy  POST-OP DIAGNOSIS: Lumbar radiculopathy    BLOOD THINNERS (ANTIPLATELETS/ANTICOAGULANTS): Were discussed with the patient and JOY Guidelines were followed.     CONSENT: Risks, benefits and options were explained to the patient, all questions were answered and written informed consent was obtained.   ANESTHESIA: See Anesthesia note    PROCEDURE NOTE: A pre-procedural time out was performed to confirm the correct patient, procedure, side, and site. Standard ASA monitors were applied and oxygen via nasal cannula was provided. All proceduralists donned sterile gloves with masks and surgical hats. The patient was placed prone with pillow under the abdomen and all pressure points padded. The patient's lumbar spine was prepped in standard fashion using Chlorhexidine and draped with sterile towels. The target neuroforamen was identified using oblique fluoroscopy and the superior vertebral body endplate squared. The overlying skin and subcutaneous tissue was anesthetized with 1% lidocaine. A 22 gauge 3.5 inch spinal needle with bent tip was incrementally advanced using intermittent fluoroscopy to the 6 o'clock position of the target pedicle in the mid-neuroforamen using oblique, AP and lateral intermittent fluoroscopy. After negative aspiration of blood and cerebrospinal fluid, needle placement was confirmed with 1 ml of omnipaque 180 mgI/ml contrast using AP fluoroscopic imaging. Imaging revealed a clear outline of the target spinal nerve with proximal spread of agent through the neuroforamen medially to the epidural space, without evidence of intravascular or intrathecal spread. After negative aspiration, a mixture containing dexamethasone 5 mg steroid and lidocaine 1% - 1 ml local  anesthetic for a total volume of 1.5 ml was injected under direct visualization with fluoroscopy. The needle was flushed, removed and a bandage applied. The same procedure utilizing the same technique was performed at each target level listed above.    EBL: None     COMPLICATIONS: None     The patient was monitored for at least 30 minutes prior to discharge. Vital signs remained stable throughout the procedure and in the recovery area. There were no immediate complications and the patient tolerated the procedure well. Sensory and motor exam was unchanged from baseline. The patient received written discharge instructions prior to discharge.     FOLLOW UP: As scheduled     ADDITIONAL NOTES:         [facility]       Mohamud Bell MD

## 2023-08-23 ENCOUNTER — APPOINTMENT (OUTPATIENT)
Dept: CT IMAGING | Facility: HOSPITAL | Age: 78
End: 2023-08-23
Payer: MEDICARE

## 2023-08-23 ENCOUNTER — HOSPITAL ENCOUNTER (OUTPATIENT)
Facility: HOSPITAL | Age: 78
Setting detail: HOSPITAL OUTPATIENT SURGERY
Discharge: HOME | End: 2023-08-23
Attending: RADIOLOGY | Admitting: RADIOLOGY
Payer: MEDICARE

## 2023-08-23 ENCOUNTER — HOSPITAL ENCOUNTER (OUTPATIENT)
Dept: NEUROLOGY | Facility: HOSPITAL | Age: 78
Discharge: HOME | End: 2023-08-23
Payer: MEDICARE

## 2023-08-23 VITALS
HEART RATE: 55 BPM | HEIGHT: 67 IN | OXYGEN SATURATION: 97 % | WEIGHT: 158.8 LBS | DIASTOLIC BLOOD PRESSURE: 69 MMHG | TEMPERATURE: 97.6 F | BODY MASS INDEX: 24.92 KG/M2 | RESPIRATION RATE: 18 BRPM | SYSTOLIC BLOOD PRESSURE: 110 MMHG

## 2023-08-23 DIAGNOSIS — M54.42 ACUTE RIGHT-SIDED LOW BACK PAIN WITH BILATERAL SCIATICA: ICD-10-CM

## 2023-08-23 DIAGNOSIS — M48.061 SPINAL STENOSIS, LUMBAR REGION, WITHOUT NEUROGENIC CLAUDICATION: ICD-10-CM

## 2023-08-23 DIAGNOSIS — M54.50 CHRONIC RIGHT-SIDED LOW BACK PAIN WITHOUT SCIATICA: ICD-10-CM

## 2023-08-23 DIAGNOSIS — M54.41 ACUTE RIGHT-SIDED LOW BACK PAIN WITH BILATERAL SCIATICA: ICD-10-CM

## 2023-08-23 DIAGNOSIS — G89.29 CHRONIC RIGHT-SIDED LOW BACK PAIN WITHOUT SCIATICA: ICD-10-CM

## 2023-08-23 LAB — GLUCOSE BLDC GLUCOMTR-MCNC: 301 MG/DL (ref 70–130)

## 2023-08-23 PROCEDURE — 82948 REAGENT STRIP/BLOOD GLUCOSE: CPT

## 2023-08-23 PROCEDURE — 25510000001 IOPAMIDOL 41 % SOLUTION: Performed by: RADIOLOGY

## 2023-08-23 PROCEDURE — 62304 MYELOGRAPHY LUMBAR INJECTION: CPT | Performed by: RADIOLOGY

## 2023-08-23 PROCEDURE — S0260 H&P FOR SURGERY: HCPCS

## 2023-08-23 PROCEDURE — 72132 CT LUMBAR SPINE W/DYE: CPT

## 2023-08-23 RX ORDER — TAMSULOSIN HYDROCHLORIDE 0.4 MG/1
1 CAPSULE ORAL NIGHTLY
COMMUNITY

## 2023-08-23 RX ORDER — METOPROLOL SUCCINATE 50 MG/1
50 TABLET, EXTENDED RELEASE ORAL DAILY
COMMUNITY

## 2023-08-23 RX ORDER — LIDOCAINE HYDROCHLORIDE 10 MG/ML
INJECTION, SOLUTION EPIDURAL; INFILTRATION; INTRACAUDAL; PERINEURAL
Status: DISCONTINUED | OUTPATIENT
Start: 2023-08-23 | End: 2023-08-23 | Stop reason: HOSPADM

## 2023-08-23 NOTE — Clinical Note
Prepped: Lumbar. Prepped with: ChloraPrep. The site was clipped. The patient was draped in a sterile fashion.

## 2023-08-23 NOTE — H&P
[facility]   HISTORY AND PHYSICAL    Patient Name: Nhi Hager  : 1945  MRN: [id number]  Primary Care Physician:  Jayce Valdez MD  Date of admission: 2023    Subjective   Subjective     Chief Complaint: Chronic low back pain with right-sided radicular pain    History of Present Illness  78-year-old male who is well-known to the neurosurgical practice under Dr. Pal's service.  He has chronic low back pain and right-sided hip pain with some occasional radicular symptoms.  He reports a recent injury back in May 2023 where he stepped off a horse, he is experienced some right-sided sciatica and neurologic type pain.  The pain is intermittent in nature and positional at nighttime, interrupts his sleep and radiates into the around the L4-L5 region.  Mainly in the right leg posterior laterally into the feet and toes.  Denies any neurogenic claudication type symptoms.  He has been doing physical therapy and pain management.  Found to be a candidate for lumbar myelogram and presents to [facility] today for CT lumbar myelogram and EMG.  All risk and benefits of diagnostic procedure discussed patient bedside and patient elected proceed.      Review of Systems   All negative less noted per HPI      Personal History     Past Medical History:   Diagnosis Date    Abnormal ECG     Acute right-sided low back pain with bilateral sciatica 2023    Anemia     Arthritis     Balance problem     vertigo    Chronic pain disorder 6-8 mos    Variable sites and times    Coronary artery disease 2000    Decreased lung capacity     Diabetes mellitus     checks sugar once per week     Extremity pain 4 mos    Seems SI joint related    GERD (gastroesophageal reflux disease)     Gout     Hepatitis A     History of transfusion     NO REACTION     Hyperlipidemia     Hypertension     Joint pain     Hip and shoulders have been relpaced    Low back pain Three mos or more    Lumbosacral disc  disease 6 weeks or more    SI joint pain primarily    Osteoarthritis Ten years    Premature ventricular contractions (PVCs) (VPCs)     OCCA    Spinal stenosis Noted on x rays    Vertigo     h/o    Wears glasses        Past Surgical History:   Procedure Laterality Date    ACHILLES TENDON SURGERY      right     CATARACT EXTRACTION      bilat     COLONOSCOPY      CORONARY ARTERY BYPASS GRAFT      4 vessles     HERNIA REPAIR      bilat inguinal    JOINT REPLACEMENT      bila hip     ORTHOPEDIC SURGERY  2000    Joint and shoulder replacements    OTHER SURGICAL HISTORY      MYELOGRAM 08/23/2023 PER DR. WISE    SHOULDER ARTHROTOMY      bilat     SINUS SURGERY      just cleaned out     TONSILLECTOMY      TOTAL SHOULDER ARTHROPLASTY Left 01/04/2021    Procedure: TOTAL SHOULDER ARTHROPLASTY, LEFT BICEPS TENODESIS;  Surgeon: Sundar Miranda MD;  Location:  [facility];  Service: Orthopedics;  Laterality: Left;    TOTAL SHOULDER ARTHROPLASTY W/ DISTAL CLAVICLE EXCISION Right 12/02/2019    Procedure: REVERSE TOTAL SHOULDER ARTHROPLASTY RIGHT;  Surgeon: Sundar Miranda MD;  Location:  [facility];  Service: Orthopedics    VASCULAR SURGERY  Heart 2000    VASECTOMY      WISDOM TOOTH EXTRACTION         Family History: family history includes Arthritis in his mother; Coronary artery disease in his brother; Diabetes in his mother; Hyperlipidemia in his brother, father, and mother; Hypertension in his brother and mother; Mental illness in his brother; Osteoporosis in his mother. Otherwise pertinent FHx was reviewed and not pertinent to current issue.    Social History:  reports that he has never smoked. He has never used smokeless tobacco. He reports current alcohol use of about 5.0 - 6.0 standard drinks per week. He reports that he does not use drugs.    Home Medications:  acetaminophen, allopurinol, amLODIPine, fluticasone, glipizide, indapamide, metoprolol succinate XL, multivitamin with minerals, omeprazole, oxybutynin XL,  rosuvastatin, and tamsulosin    Allergies:  Allergies   Allergen Reactions    Ciprofloxacin GI Intolerance    Levofloxacin GI Intolerance       Objective    Objective     Vitals:   Temp:  [97.6 øF (36.4 øC)] 97.6 øF (36.4 øC)  Heart Rate:  [66-68] 66  Resp:  [18] 18  BP: (126-139)/(64-83) 126/83    Physical Exam  Constitutional:       Appearance: Normal appearance.   HENT:      Head: Normocephalic and atraumatic.   Eyes:      Extraocular Movements: Extraocular movements intact.      Pupils: Pupils are equal, round, and reactive to light.   Cardiovascular:      Rate and Rhythm: Normal rate.   Pulmonary:      Effort: Pulmonary effort is normal.   Abdominal:      General: Abdomen is flat.   Musculoskeletal:      Cervical back: Normal range of motion.   Skin:     General: Skin is warm and dry.      Capillary Refill: Capillary refill takes less than 2 seconds.   Neurological:      General: No focal deficit present.      Mental Status: He is alert and oriented to person, place, and time. Mental status is at baseline.       Result Review    Result Review:  I have personally reviewed the results from the time of this admission to 8/23/2023 08:10 EDT and agree with these findings:  []  Laboratory list / accordion  []  Microbiology  []  Radiology  []  EKG/Telemetry   []  Cardiology/Vascular   []  Pathology  []  Old records  [x]  Other:  Most notable findings include: N/A.      Assessment & Plan   Assessment / Plan     Active Hospital Problems:  Active Hospital Problems    Diagnosis     Spinal stenosis, lumbar region, without neurogenic claudication     Acute right-sided low back pain with bilateral sciatica     Chronic right-sided low back pain without sciatica      Plan: 77-year-old male who is well-known to the neurosurgical practice under Dr. Pal service.  Has chronic low back pain and right-sided radicular symptoms extend posterior laterally down to the feet and toes.  Patient is undergone physical therapy and pain  management with epidural steroid injections and has received some modest relief.  Patient was found to be a candidate for lumbar myelogram by Dr. Pal.  Subsequently presented [facility] today to undergo CT lumbar myelogram and EMG nerve conduction study.  All risk and benefits of the diagnostic procedure were discussed with the patient the bedside patient elected proceed.  Patient will follow-up with Dr. Pal in the neurosurgical outpatient clinic to review the results of CT lumbar myelogram after the procedure is complete.      DVT prophylaxis:  No DVT prophylaxis order currently exists.    CODE STATUS:         Kem Chacon PA-C

## 2024-01-16 ENCOUNTER — OFFICE VISIT (OUTPATIENT)
Dept: PAIN MEDICINE | Facility: CLINIC | Age: 79
End: 2024-01-16
Payer: MEDICARE

## 2024-01-16 VITALS — BODY MASS INDEX: 26.21 KG/M2 | HEIGHT: 67 IN | WEIGHT: 167 LBS

## 2024-01-16 DIAGNOSIS — M54.16 LUMBAR RADICULOPATHY: Primary | ICD-10-CM

## 2024-01-16 PROCEDURE — 1160F RVW MEDS BY RX/DR IN RCRD: CPT | Performed by: STUDENT IN AN ORGANIZED HEALTH CARE EDUCATION/TRAINING PROGRAM

## 2024-01-16 PROCEDURE — 99213 OFFICE O/P EST LOW 20 MIN: CPT | Performed by: STUDENT IN AN ORGANIZED HEALTH CARE EDUCATION/TRAINING PROGRAM

## 2024-01-16 PROCEDURE — 1125F AMNT PAIN NOTED PAIN PRSNT: CPT | Performed by: STUDENT IN AN ORGANIZED HEALTH CARE EDUCATION/TRAINING PROGRAM

## 2024-01-16 PROCEDURE — 1159F MED LIST DOCD IN RCRD: CPT | Performed by: STUDENT IN AN ORGANIZED HEALTH CARE EDUCATION/TRAINING PROGRAM

## 2024-01-16 RX ORDER — ACETAMINOPHEN/DIPHENHYDRAMINE 500MG-25MG
TABLET ORAL NIGHTLY PRN
COMMUNITY

## 2024-01-16 RX ORDER — ALFUZOSIN HYDROCHLORIDE 10 MG/1
TABLET, EXTENDED RELEASE ORAL EVERY 24 HOURS
COMMUNITY

## 2024-01-16 NOTE — PROGRESS NOTES
Primary Physician: Jayce Valdez MD    Initial HPI 5/31/2023:  Mr. Nhi Hager is 78 y.o. male who presents as a new patient referral for evaluation treatment of chronic low back pain with radiation right lower extremity.  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  He states that he was in his normal state of health with moderate intermittent low back pain when he was dismounting from a horse approximately 2 months ago and suffered acute onset low back pain with radiation of the right lower extremity.  He describes numbness and tingling and pain from his right buttock down the posterior lateral aspect of his thigh and shin calf into the lateral aspect of his foot and small toe.  Patient denies any bowel or bladder dysfunction, lower extremity weakness, new onset saddle anesthesia or unexplained weight loss.   He has trialed nightly pregabalin which did cause some morning sedation.  He is engaged in physical therapy which has been helpful.  Unfortunately his pain has limited his activities of daily living, sleep, ability to ambulate.  He has additionally trialed Tylenol, NSAIDs, tramadol, Medrol Dosepak.  The Medrol Dosepak was quite helpful.  He additionally sought consultation with neurosurgery, Dr. Yakov Pal, who recommended conservative management.    Interval history: Patient now status post right L4/L5 and L5/S1 TFESI.  He has had excellent relief just barely starting to wane at this point.  He is very happy with his relief.  Having some mild right trochanteric bursitis.  Additionally has some right knee pain.    6/15/23: Right L5/S1 and S1 lumbar Transforaminal Epidural Steroid Injection with 100% resolution for 1 week  7/27/2023: Right L4/5 and L5/S1 TFESI with 80% relief ongoing (6 months so far)      Objective Pain Scoring:   BRIEF PAIN INVENTORY:  Total score:   Pain Score    01/16/24 1413   PainSc:   2   PainLoc: Back      PHQ-2: PHQ-2 Total Score:  0  PHQ-9: PHQ-9: Brief Depression Severity Measure Score: 0  Opioid Risk Tool:         Review of Systems:   ROS negative except as otherwise noted     Past Medical History:   Past Medical History:   Diagnosis Date    Abnormal ECG 2000    Acute right-sided low back pain with bilateral sciatica 7/26/2023    Anemia 2022    Arthritis     Balance problem     vertigo    Chronic pain disorder 6-8 mos    Variable sites and times    Coronary artery disease 2000    Decreased lung capacity     Diabetes mellitus     checks sugar once per week     Extremity pain 4 mos    Seems SI joint related    GERD (gastroesophageal reflux disease)     Gout     Hepatitis A     History of transfusion     NO REACTION     Hyperlipidemia     Hypertension     Joint pain 1999    Hip and shoulders have been relpaced    Low back pain Three mos or more    Lumbosacral disc disease 6 weeks or more    SI joint pain primarily    Osteoarthritis Ten years    Premature ventricular contractions (PVCs) (VPCs)     OCCA    Spinal stenosis Noted on x rays    Vertigo     h/o    Wears glasses          Past Surgical History:   Past Surgical History:   Procedure Laterality Date    ACHILLES TENDON SURGERY      right     CATARACT EXTRACTION      bilat     COLONOSCOPY      CORONARY ARTERY BYPASS GRAFT      4 vessles     HERNIA REPAIR      bilat inguinal    INTERVENTIONAL RADIOLOGY PROCEDURE N/A 8/23/2023    Procedure: IR myelogram lumbar with fluoroscopy;  Surgeon: Ruslan Umana MD;  Location: [facility] INVASIVE LOCATION;  Service: Interventional Radiology;  Laterality: N/A;    JOINT REPLACEMENT      bila hip     ORTHOPEDIC SURGERY  2000    Joint and shoulder replacements    OTHER SURGICAL HISTORY      MYELOGRAM 08/23/2023 PER DR. UMANA    SHOULDER ARTHROTOMY      bilat     SINUS SURGERY      just cleaned out     TONSILLECTOMY      TOTAL SHOULDER ARTHROPLASTY Left 01/04/2021    Procedure: TOTAL SHOULDER ARTHROPLASTY, LEFT BICEPS TENODESIS;  Surgeon: Sundar Miranda  MD Kem;  Location:  [facility];  Service: Orthopedics;  Laterality: Left;    TOTAL SHOULDER ARTHROPLASTY W/ DISTAL CLAVICLE EXCISION Right 12/02/2019    Procedure: REVERSE TOTAL SHOULDER ARTHROPLASTY RIGHT;  Surgeon: Sundar Miranda MD;  Location:  [facility];  Service: Orthopedics    VASCULAR SURGERY  Heart 2000    VASECTOMY      WISDOM TOOTH EXTRACTION           Family History   Family History   Problem Relation Age of Onset    Hyperlipidemia Mother     Hypertension Mother     Arthritis Mother     Diabetes Mother     Osteoporosis Mother     Hyperlipidemia Brother     Hypertension Brother     Coronary artery disease Brother     Mental illness Brother     Hyperlipidemia Father          Social History   Social History     Socioeconomic History    Marital status:    Tobacco Use    Smoking status: Never    Smokeless tobacco: Never   Vaping Use    Vaping Use: Never used   Substance and Sexual Activity    Alcohol use: Yes     Alcohol/week: 5.0 - 6.0 standard drinks of alcohol     Types: 1 - 2 Glasses of wine, 2 Cans of beer, 2 Drinks containing 0.5 oz of alcohol per week     Comment: social     Drug use: No    Sexual activity: Not Currently     Partners: Female     Birth control/protection: Vasectomy     Comment:         Medications:     Current Outpatient Medications:     acetaminophen (TYLENOL) 500 MG tablet, Take 1 tablet by mouth Every 6 (Six) Hours As Needed for Mild Pain., Disp: , Rfl:     alfuzosin (UROXATRAL) 10 MG 24 hr tablet, Daily., Disp: , Rfl:     allopurinol (ZYLOPRIM) 100 MG tablet, Take 2 tablets by mouth Daily., Disp: , Rfl:     amLODIPine (NORVASC) 5 MG tablet, Take 1 tablet by mouth Daily., Disp: , Rfl:     diphenhydrAMINE-acetaminophen (Tylenol PM Extra Strength)  MG tablet per tablet, At Night As Needed., Disp: , Rfl:     fluticasone (FLONASE) 50 MCG/ACT nasal spray, 2 sprays into the nostril(s) as directed by provider Daily., Disp: , Rfl:     glipizide (GLUCOTROL XL) 5 MG  "ER tablet, Take 1 tablet by mouth 2 (Two) Times a Day., Disp: , Rfl:     indapamide (LOZOL) 2.5 MG tablet, Take 1 tablet by mouth Daily., Disp: , Rfl:     metoprolol succinate XL (TOPROL-XL) 50 MG 24 hr tablet, Take 1 tablet by mouth Daily., Disp: , Rfl:     multivitamin with minerals tablet tablet, Take 1 tablet by mouth Daily., Disp: , Rfl:     omeprazole (priLOSEC) 20 MG capsule, Take 1 capsule by mouth Daily., Disp: , Rfl:     oxybutynin XL (DITROPAN-XL) 5 MG 24 hr tablet, Take 1 tablet by mouth Daily., Disp: , Rfl:     rosuvastatin (CRESTOR) 40 MG tablet, Take 1 tablet by mouth Daily., Disp: , Rfl:         Physical Exam:     Vitals:    01/16/24 1413   Weight: 75.8 kg (167 lb)   Height: 170.2 cm (67\")   PainSc:   2   PainLoc: Back        General: Alert and oriented, No acute distress.   HEENT: Normocephalic, atraumatic.   Cardiovascular: No gross edema  Respiratory: Respirations are non-labored    TTP right GTB    Motor Exam:    Strength: Rate on 1-5 scale Right Left    L1/2- hip flexion 5 5    L3- knee extension 5 5    L4- ankle dorsiflexion 5 5    L5- great toe extension 5 5    S1- ankle plantarflexion 5 5    Sensory Exam: Decreased sensation to light touch in right S1 dermatome.    Neurologic: Cranial Nerves II-XII are grossly intact.   Clonus -negative bilaterally    Psychiatric: Cooperative.   Gait: Antalgic  Assistive Devices: None    Imaging Studies:   Results for orders placed during the hospital encounter of 05/20/23    MRI Lumbar Spine Without Contrast    Narrative  MRI LUMBAR SPINE WO CONTRAST    Date of Exam: 5/20/2023 9:04 AM EDT    Indication: spinal stenosis.    Comparison: None available.    Technique:  Routine multiplanar/multisequence sequence images of the lumbar spine were obtained without contrast administration.    Findings:  There is diffuse marrow hyperintensity present, suggesting demineralization, otherwise without evidence of fracture or suspicious marrow replacing lesion. " Straightening is present in addition to minimal retrolisthesis of L2 on L3 and L3 on L4. The conus  medullaris and cauda equina nerve roots are satisfactory in appearance. The paraspinal soft tissues demonstrate no acute or suspicious findings. Multilevel spondylosis is present,, with contributing component of epidural lipomatosis, with areas of  involvement including    L1-2, small disc bulge and bilateral facet arthropathy. There is mild spinal canal stenosis and moderate bilateral neuroforaminal narrowing, greater on the right.    L2-3, small disc bulge and bilateral facet arthropathy. There is mild to moderate spinal canal narrowing in addition to moderate to severe left and severe right neuroforaminal narrowing.    L3-4, small disc bulge and bilateral facet arthropathy. There is moderate spinal canal stenosis in addition to severe left and moderate to severe right neuroforaminal narrowing.    L4-5, small disc bulge, bilateral facet arthropathy and component of epidural lipomatosis. There is moderate spinal canal narrowing in addition to severe left and moderate to severe right neuroforaminal stenosis.    L5-S1, small disc bulge and bilateral facet arthropathy, with contributing component of epidural lipomatosis. There is resultant moderate spinal canal narrowing in addition to severe bilateral neuroforaminal stenosis.    Impression  Impression:  Multilevel lumbar spondylosis is present, with prominent component of multilevel advanced facet arthropathy. There is resultant multilevel severe neuroforaminal narrowing present as above. Somewhat generalized moderate multilevel spinal canal stenosis is  present.    Electronically Signed: Paulino Vera  5/21/2023 7:14 AM EDT  Workstation ID: [id number]      Impression & Plan:   5/31/2023: Nhi Hager is a 78 y.o. male with past medical history significant for DM, CAD, GERD, gout, HTN who presents to the pain clinic for evaluation and treatment of chronic right-sided  low back pain with radiation of the right lower extremity.  I personally reviewed his lumbar MRI dated 5/20/2023: Scoliosis, multilevel severe degenerative disc disease with Modic 2 endplate changes throughout lumbar spine; no significant canal stenosis; multilevel bilateral neuroforaminal stenosis.  Clinical examination most consistent with right L5, S1 radiculopathy which correlates with right-sided L5/S1 neuroforaminal and lateral recess stenosis secondary to disc herniation and juxta facet hypertrophy/cyst.  We discussed epidural steroid injection to improve pain.  If greater than 50% relief for at least 2-3 months can consider repeat as needed every 3 to 4 months.  I had an in-depth discussion with the patient regarding the risks of the procedure including bleeding, infection, damage to surrounding structures, paralysis.  We discussed the potential adverse effects of corticosteroid injection including flushing of the face, lipodystrophy, skin discoloration, elevated blood glucose, increased blood pressure.  Risks of frequent steroid administration include weight gain, hormonal changes, mood changes, osteoporosis.  7/12/2023: Excellent but transient benefit with epidural.  Will refer back to Dr. Pal for surgical consideration.  Hip MRI negative.  1/16/2024: Continued benefit from TFESI.  Can follow-up as needed      1. Lumbar radiculopathy            PLAN:  1. Medication Recommendations: Recommend Voltaren topical, NSAIDs, Tylenol.  Can trial turmeric 500 mg twice daily if NSAID contraindicated.  Agree with pregabalin    2. Physical Therapy: Continue HEP    3. Psychological: defer    4. Complementary and alternative (CAM) Therapies:     5. Labs: None indicated     6. Imaging: None.  Reviewed hip MRI, negative.    7. Interventions: Patient can call for repeat right L4/5 L5/S1 transforaminal epidural steroid injection (57764, [id number]) if less than 6 months since office visit.    8. Referrals: None    9. Records  requested: n/a    10. Lifestyle goals:    Follow-up as needed      [facility]  Mohamud Bell MD

## 2024-02-27 ENCOUNTER — TELEPHONE (OUTPATIENT)
Dept: PAIN MEDICINE | Facility: CLINIC | Age: 79
End: 2024-02-27
Payer: MEDICARE

## 2024-02-27 NOTE — TELEPHONE ENCOUNTER
Caller: Nhi Hager    Relationship to patient: Self    Best call back number: [phone]    Chief complaint: LUMBAR     Type of visit: FOLLOW UP     Requested date: ASAP      Additional notes:PATIENT DID NOT WANT TO SEE CRISELDA ROBERTO

## 2024-02-28 DIAGNOSIS — M54.16 LUMBAR RADICULOPATHY: Primary | ICD-10-CM

## 2024-03-13 ENCOUNTER — DOCUMENTATION (OUTPATIENT)
Dept: PAIN MEDICINE | Facility: CLINIC | Age: 79
End: 2024-03-13

## 2024-03-13 ENCOUNTER — OUTSIDE FACILITY SERVICE (OUTPATIENT)
Dept: PAIN MEDICINE | Facility: CLINIC | Age: 79
End: 2024-03-13
Payer: MEDICARE

## 2024-03-13 PROCEDURE — 64483 NJX AA&/STRD TFRM EPI L/S 1: CPT | Performed by: STUDENT IN AN ORGANIZED HEALTH CARE EDUCATION/TRAINING PROGRAM

## 2024-03-13 PROCEDURE — 64484 NJX AA&/STRD TFRM EPI L/S EA: CPT | Performed by: STUDENT IN AN ORGANIZED HEALTH CARE EDUCATION/TRAINING PROGRAM

## 2024-03-13 NOTE — PROGRESS NOTES
[facility]  [address] [place] [id number]      PROCEDURE: Fluoroscopically-guided Right L4/5 and L5/S1 Lumbar Transforaminal Epidural Steroid Injection     PRE-OP DIAGNOSIS: Lumbar radiculopathy  POST-OP DIAGNOSIS: Same    BLOOD THINNERS (ANTIPLATELETS/ANTICOAGULANTS): Were discussed with the patient and JOY Guidelines were followed.     CONSENT: Risks, benefits and options were explained to the patient, all questions were answered and written informed consent was obtained.   ANESTHESIA: Local only    PROCEDURE NOTE: A pre-procedural time out was performed to confirm the correct patient, procedure, side, and site. Standard ASA monitors were applied and oxygen via nasal cannula was provided. All proceduralists donned sterile gloves with masks and surgical hats. The patient was placed prone with pillow under the abdomen and all pressure points padded. The patient's lumbar spine was prepped in standard fashion using Chlorhexidine and draped with sterile towels. The target neuroforamen was identified using oblique fluoroscopy and the superior vertebral body endplate squared. The overlying skin and subcutaneous tissue was anesthetized with 1% lidocaine. A 22 gauge 5 inch spinal needle with bent tip was incrementally advanced using intermittent fluoroscopy to the 6 o'clock position of the target pedicle in the mid-neuroforamen using oblique, AP and lateral intermittent fluoroscopy. After negative aspiration of blood and cerebrospinal fluid, needle placement was confirmed with 1 ml of omnipaque 180 mgI/ml contrast using AP fluoroscopic imaging. Imaging revealed a clear outline of the target spinal nerve with proximal spread of agent through the neuroforamen medially to the epidural space, without evidence of intravascular or intrathecal spread. After negative aspiration, a mixture containing dexamethasone 10 mg steroid and lidocaine 1% - 1 ml local anesthetic for a total volume of 2  ml was injected under direct visualization with fluoroscopy. The needle was flushed, removed and a bandage applied. The same procedure utilizing the same technique was performed at each target level listed above.    EBL: None     COMPLICATIONS: None     The patient was monitored until meeting appropriate discharge criteria. Vital signs remained stable throughout the procedure and in the recovery area. There were no immediate complications and the patient tolerated the procedure well. Sensory and motor exam was unchanged from baseline. The patient received written discharge instructions prior to discharge.     FOLLOW UP: As scheduled     ADDITIONAL NOTES:   Access to the foramen was difficult given the presence of osteophytes.  Consider interlaminar approach in future      [facility]       Mohamud Bell MD     Codes:  [id number],  [id number]

## 2024-07-08 ENCOUNTER — OFFICE VISIT (OUTPATIENT)
Dept: PAIN MEDICINE | Facility: CLINIC | Age: 79
End: 2024-07-08
Payer: MEDICARE

## 2024-07-08 VITALS — BODY MASS INDEX: 25.58 KG/M2 | WEIGHT: 163 LBS | HEIGHT: 67 IN

## 2024-07-08 DIAGNOSIS — M54.16 LUMBAR RADICULOPATHY: Primary | ICD-10-CM

## 2024-07-08 PROCEDURE — G2211 COMPLEX E/M VISIT ADD ON: HCPCS | Performed by: STUDENT IN AN ORGANIZED HEALTH CARE EDUCATION/TRAINING PROGRAM

## 2024-07-08 PROCEDURE — 1159F MED LIST DOCD IN RCRD: CPT | Performed by: STUDENT IN AN ORGANIZED HEALTH CARE EDUCATION/TRAINING PROGRAM

## 2024-07-08 PROCEDURE — 99214 OFFICE O/P EST MOD 30 MIN: CPT | Performed by: STUDENT IN AN ORGANIZED HEALTH CARE EDUCATION/TRAINING PROGRAM

## 2024-07-08 PROCEDURE — 1125F AMNT PAIN NOTED PAIN PRSNT: CPT | Performed by: STUDENT IN AN ORGANIZED HEALTH CARE EDUCATION/TRAINING PROGRAM

## 2024-07-08 PROCEDURE — 1160F RVW MEDS BY RX/DR IN RCRD: CPT | Performed by: STUDENT IN AN ORGANIZED HEALTH CARE EDUCATION/TRAINING PROGRAM

## 2024-07-08 RX ORDER — PREGABALIN 50 MG/1
50 CAPSULE ORAL 2 TIMES DAILY
Qty: 60 CAPSULE | Refills: 0 | Status: SHIPPED | OUTPATIENT
Start: 2024-07-08

## 2024-07-08 RX ORDER — MIRABEGRON 50 MG/1
1 TABLET, FILM COATED, EXTENDED RELEASE ORAL DAILY
COMMUNITY
Start: 2024-06-06

## 2024-07-08 RX ORDER — MOMETASONE FUROATE 1 MG/ML
SOLUTION TOPICAL
COMMUNITY
Start: 2024-06-19

## 2024-07-08 RX ORDER — BLOOD SUGAR DIAGNOSTIC
STRIP MISCELLANEOUS
COMMUNITY
Start: 2024-06-18

## 2024-07-08 NOTE — PROGRESS NOTES
Primary Physician: Jayce Valdez MD    Initial HPI 5/31/2023:  Mr. Nhi Hager is 78 y.o. male who presents as a new patient referral for evaluation treatment of chronic low back pain with radiation right lower extremity.  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  He states that he was in his normal state of health with moderate intermittent low back pain when he was dismounting from a horse approximately 2 months ago and suffered acute onset low back pain with radiation of the right lower extremity.  He describes numbness and tingling and pain from his right buttock down the posterior lateral aspect of his thigh and shin calf into the lateral aspect of his foot and small toe.  Patient denies any bowel or bladder dysfunction, lower extremity weakness, new onset saddle anesthesia or unexplained weight loss.   He has trialed nightly pregabalin which did cause some morning sedation.  He is engaged in physical therapy which has been helpful.  Unfortunately his pain has limited his activities of daily living, sleep, ability to ambulate.  He has additionally trialed Tylenol, NSAIDs, tramadol, Medrol Dosepak.  The Medrol Dosepak was quite helpful.  He additionally sought consultation with neurosurgery, Dr. Yakov Pal, who recommended conservative management.    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  He again had excellent relief however it wore off about 4 days ago.  He is interested in escalating his management or finding strategies for more long-term benefit.    6/15/23: Right L5/S1 and S1 lumbar Transforaminal Epidural Steroid Injection with 100% resolution for 1 week  7/27/2023: Right L4/5 and L5/S1 TFESI with 80% relief ongoing (6 months so far)  3/13/2024: Right L4/5 and L5/S1 TFESI with 80% relief for 4 months    Objective Pain Scoring:   BRIEF PAIN INVENTORY:  Total score:    Pain Score    07/08/24 1312   PainSc:   8   PainLoc: Back      PHQ-2: PHQ-2 Total Score: 1  PHQ-9: PHQ-9: Brief Depression Severity Measure Score: 1  Opioid Risk Tool:         Review of Systems:   ROS negative except as otherwise noted     Past Medical History:   Past Medical History:   Diagnosis Date    Abnormal ECG 2000    Acute right-sided low back pain with bilateral sciatica 7/26/2023    Anemia 2022    Arthritis     Balance problem     vertigo    Chronic pain disorder 6-8 mos    Variable sites and times    Coronary artery disease 2000    Decreased lung capacity     Diabetes mellitus     checks sugar once per week     Extremity pain 4 mos    Seems SI joint related    GERD (gastroesophageal reflux disease)     Gout     Hepatitis A     History of transfusion     NO REACTION     Hyperlipidemia     Hypertension     Joint pain 1999    Hip and shoulders have been relpaced    Low back pain Three mos or more    Lumbosacral disc disease 6 weeks or more    SI joint pain primarily    Osteoarthritis Ten years    Premature ventricular contractions (PVCs) (VPCs)     OCCA    Spinal stenosis Noted on x rays    Vertigo     h/o    Wears glasses          Past Surgical History:   Past Surgical History:   Procedure Laterality Date    ACHILLES TENDON SURGERY      right     CATARACT EXTRACTION      bilat     COLONOSCOPY      CORONARY ARTERY BYPASS GRAFT      4 vessles     EPIDURAL BLOCK  2023    HERNIA REPAIR      bilat inguinal    INTERVENTIONAL RADIOLOGY PROCEDURE N/A 08/23/2023    Procedure: IR myelogram lumbar with fluoroscopy;  Surgeon: Ruslan Umana MD;  Location: [facility] CATH INVASIVE LOCATION;  Service: Interventional Radiology;  Laterality: N/A;    JOINT REPLACEMENT      bila hip     ORTHOPEDIC SURGERY  2000    Joint and shoulder replacements    OTHER SURGICAL HISTORY      MYELOGRAM 08/23/2023 PER DR. UMANA    SHOULDER ARTHROTOMY      bilat     SINUS SURGERY      just cleaned out     TONSILLECTOMY      TOTAL SHOULDER  ARTHROPLASTY Left 01/04/2021    Procedure: TOTAL SHOULDER ARTHROPLASTY, LEFT BICEPS TENODESIS;  Surgeon: Sundar Miranda MD;  Location:  [facility];  Service: Orthopedics;  Laterality: Left;    TOTAL SHOULDER ARTHROPLASTY W/ DISTAL CLAVICLE EXCISION Right 12/02/2019    Procedure: REVERSE TOTAL SHOULDER ARTHROPLASTY RIGHT;  Surgeon: Sundar Miranda MD;  Location:  [facility];  Service: Orthopedics    VASCULAR SURGERY  Heart 2000    VASECTOMY      WISDOM TOOTH EXTRACTION           Family History   Family History   Problem Relation Age of Onset    Hyperlipidemia Mother     Hypertension Mother     Arthritis Mother         DM    Diabetes Mother     Osteoporosis Mother     Hyperlipidemia Brother     Hypertension Brother     Coronary artery disease Brother     Mental illness Brother     Hyperlipidemia Father          Social History   Social History     Socioeconomic History    Marital status:    Tobacco Use    Smoking status: Never    Smokeless tobacco: Never   Vaping Use    Vaping status: Never Used   Substance and Sexual Activity    Alcohol use: Yes     Alcohol/week: 5.0 - 6.0 standard drinks of alcohol     Types: 1 - 2 Glasses of wine, 2 Cans of beer, 2 Drinks containing 0.5 oz of alcohol per week     Comment: social     Drug use: No    Sexual activity: Not Currently     Partners: Female     Birth control/protection: Vasectomy     Comment:         Medications:     Current Outpatient Medications:     acetaminophen (TYLENOL) 500 MG tablet, Take 1 tablet by mouth Every 6 (Six) Hours As Needed for Mild Pain., Disp: , Rfl:     alfuzosin (UROXATRAL) 10 MG 24 hr tablet, Daily., Disp: , Rfl:     allopurinol (ZYLOPRIM) 100 MG tablet, Take 2 tablets by mouth Daily., Disp: , Rfl:     amLODIPine (NORVASC) 5 MG tablet, Take 1 tablet by mouth Daily., Disp: , Rfl:     diphenhydrAMINE-acetaminophen (Tylenol PM Extra Strength)  MG tablet per tablet, At Night As Needed., Disp: , Rfl:     fluticasone (FLONASE)  "50 MCG/ACT nasal spray, 2 sprays into the nostril(s) as directed by provider Daily., Disp: , Rfl:     glipizide (GLUCOTROL XL) 5 MG ER tablet, Take 1 tablet by mouth 2 (Two) Times a Day., Disp: , Rfl:     indapamide (LOZOL) 2.5 MG tablet, Take 1 tablet by mouth Daily., Disp: , Rfl:     metoprolol succinate XL (TOPROL-XL) 50 MG 24 hr tablet, Take 1 tablet by mouth Daily., Disp: , Rfl:     mometasone (ELOCON) 0.1 % solution, APPLY FEW DROPS TOPICALLY TO THE AFFECTED AREA DAILY, Disp: , Rfl:     multivitamin with minerals tablet tablet, Take 1 tablet by mouth Daily., Disp: , Rfl:     Myrbetriq 50 MG tablet sustained-release 24 hour 24 hr tablet, Take 50 mg by mouth Daily., Disp: , Rfl:     omeprazole (priLOSEC) 20 MG capsule, Take 1 capsule by mouth Daily., Disp: , Rfl:     OneTouch Ultra test strip, TEST BLOOD SUGAR 1 - 3 TIMES PER DAY, Disp: , Rfl:     oxybutynin XL (DITROPAN-XL) 5 MG 24 hr tablet, Take 1 tablet by mouth Daily., Disp: , Rfl:     rosuvastatin (CRESTOR) 40 MG tablet, Take 1 tablet by mouth Daily., Disp: , Rfl:         Physical Exam:     Vitals:    07/08/24 1312   Weight: 73.9 kg (163 lb)   Height: 170.2 cm (67\")   PainSc:   8   PainLoc: Back        General: Alert and oriented, No acute distress.   HEENT: Normocephalic, atraumatic.   Cardiovascular: No gross edema  Respiratory: Respirations are non-labored    TTP right GTB    Motor Exam:    Strength: Rate on 1-5 scale Right Left    L1/2- hip flexion 5 5    L3- knee extension 5 5    L4- ankle dorsiflexion 5 5    L5- great toe extension 5 5    S1- ankle plantarflexion 5 5    Sensory Exam: Decreased sensation to light touch in right S1 dermatome.    Neurologic: Cranial Nerves II-XII are grossly intact.   Clonus -negative bilaterally    Psychiatric: Cooperative.   Gait: Antalgic  Assistive Devices: None    Imaging Studies:   Results for orders placed during the hospital encounter of 05/20/23    MRI Lumbar Spine Without Contrast    Narrative  MRI LUMBAR " SPINE WO CONTRAST    Date of Exam: 5/20/2023 9:04 AM EDT    Indication: spinal stenosis.    Comparison: None available.    Technique:  Routine multiplanar/multisequence sequence images of the lumbar spine were obtained without contrast administration.    Findings:  There is diffuse marrow hyperintensity present, suggesting demineralization, otherwise without evidence of fracture or suspicious marrow replacing lesion. Straightening is present in addition to minimal retrolisthesis of L2 on L3 and L3 on L4. The conus  medullaris and cauda equina nerve roots are satisfactory in appearance. The paraspinal soft tissues demonstrate no acute or suspicious findings. Multilevel spondylosis is present,, with contributing component of epidural lipomatosis, with areas of  involvement including    L1-2, small disc bulge and bilateral facet arthropathy. There is mild spinal canal stenosis and moderate bilateral neuroforaminal narrowing, greater on the right.    L2-3, small disc bulge and bilateral facet arthropathy. There is mild to moderate spinal canal narrowing in addition to moderate to severe left and severe right neuroforaminal narrowing.    L3-4, small disc bulge and bilateral facet arthropathy. There is moderate spinal canal stenosis in addition to severe left and moderate to severe right neuroforaminal narrowing.    L4-5, small disc bulge, bilateral facet arthropathy and component of epidural lipomatosis. There is moderate spinal canal narrowing in addition to severe left and moderate to severe right neuroforaminal stenosis.    L5-S1, small disc bulge and bilateral facet arthropathy, with contributing component of epidural lipomatosis. There is resultant moderate spinal canal narrowing in addition to severe bilateral neuroforaminal stenosis.    Impression  Impression:  Multilevel lumbar spondylosis is present, with prominent component of multilevel advanced facet arthropathy. There is resultant multilevel severe  neuroforaminal narrowing present as above. Somewhat generalized moderate multilevel spinal canal stenosis is  present.    Electronically Signed: Paulino Vera  5/21/2023 7:14 AM EDT  Workstation ID: [id number]      Impression & Plan:   5/31/2023: Nhi Hager is a 78 y.o. male with past medical history significant for DM, CAD, GERD, gout, HTN who presents to the pain clinic for evaluation and treatment of chronic right-sided low back pain with radiation of the right lower extremity.  I personally reviewed his lumbar MRI dated 5/20/2023: Scoliosis, multilevel severe degenerative disc disease with Modic 2 endplate changes throughout lumbar spine; no significant canal stenosis; multilevel bilateral neuroforaminal stenosis.  Clinical examination most consistent with right L5, S1 radiculopathy which correlates with right-sided L5/S1 neuroforaminal and lateral recess stenosis secondary to disc herniation and juxta facet hypertrophy/cyst.  We discussed epidural steroid injection to improve pain.  If greater than 50% relief for at least 2-3 months can consider repeat as needed every 3 to 4 months.  I had an in-depth discussion with the patient regarding the risks of the procedure including bleeding, infection, damage to surrounding structures, paralysis.  We discussed the potential adverse effects of corticosteroid injection including flushing of the face, lipodystrophy, skin discoloration, elevated blood glucose, increased blood pressure.  Risks of frequent steroid administration include weight gain, hormonal changes, mood changes, osteoporosis.  7/12/2023: Excellent but transient benefit with epidural.  Will refer back to Dr. Pal for surgical consideration.  Hip MRI negative.  1/16/2024: Continued benefit from TFESI.  Can follow-up as needed   7/8/2024: Good treatment benefit with repeat TFESI.  Will add pregabalin.  Additionally discussed SCS trial and patient would like to proceed.      1. Lumbar radiculopathy               PLAN:  1. Medication Recommendations: Recommend Voltaren topical, NSAIDs, Tylenol.  Can trial turmeric 500 mg twice daily if NSAID contraindicated.  -Start pregabalin 50 mg twice daily  -As part of this patient's treatment plan, patient will be prescribed controlled substances. The patient has been made aware of appropriate use of such medications, including potential risk of somnolence, limited ability to drive and /or work safely, and potential for dependence or overdose. It has also been made clear that these medications are for use by this patient only, without concomitant use of alcohol or other substances unless prescribed.Controlled substance status of medication discussed with patient, discussed risks of medication including abuse potential and diversion potential and need to follow up for reevaluation appointment in order to receive further refills.  Mahendra was reviewed and compliant.    2. Physical Therapy: Continue HEP    3. Psychological: Obtain psych clearance for SCS    4. Complementary and alternative (CAM) Therapies:     5. Labs: None indicated     6. Imaging: None.  Reviewed hip MRI, negative.  Reviewed CT myelogram report    7. Interventions: Schedule repeat right L4/5 L5/S1 transforaminal epidural steroid injection (15730, [id number]).  Additionally will plan for SCS trial with [employer] after psychiatric clearance     8. Referrals: None    9. Records: I discussed the case with Dr. Yakov Pal who agrees with spinal cord stimulator trial    10. Lifestyle goals:    Follow-up 1 month      [facility]  Mohamud Bell MD

## 2024-07-09 ENCOUNTER — TELEPHONE (OUTPATIENT)
Dept: PAIN MEDICINE | Facility: CLINIC | Age: 79
End: 2024-07-09
Payer: MEDICARE

## 2024-07-09 DIAGNOSIS — Z00.8 ENCOUNTER FOR PRE-SURGICAL PSYCHOLOGICAL ASSESSMENT: Primary | ICD-10-CM

## 2024-07-09 NOTE — TELEPHONE ENCOUNTER
Called patient,advised him Dr Bell sent a referral to [facility] for his pre-surg psych clearance. Patient voiced understanding.

## 2024-07-11 ENCOUNTER — OUTSIDE FACILITY SERVICE (OUTPATIENT)
Dept: PAIN MEDICINE | Facility: CLINIC | Age: 79
End: 2024-07-11
Payer: MEDICARE

## 2024-07-11 ENCOUNTER — DOCUMENTATION (OUTPATIENT)
Dept: PAIN MEDICINE | Facility: CLINIC | Age: 79
End: 2024-07-11

## 2024-07-11 NOTE — PROGRESS NOTES
[facility]  [address] [place] [address]      PROCEDURE: Fluoroscopically-guided  Lumbar Transforaminal Epidural Steroid Injection - right L4/5 and L5/S1    PRE-OP DIAGNOSIS: Lumbar radiculopathy  POST-OP DIAGNOSIS: Lumbar radiculopathy    BLOOD THINNERS (ANTIPLATELETS/ANTICOAGULANTS): Were discussed with the patient and JOY Guidelines were followed.     CONSENT: Risks, benefits and options were explained to the patient, all questions were answered and written informed consent was obtained.     ANESTHESIA: Moderate sedation was required to maintain comfort, safety, and cooperation during the procedure.  The duration of sedation service was over 10 minutes.  Patient received 1mg IV Versed and 75mcg IV fentanyl.  Independent observation and monitoring was performed by Sheri Zamora RN.  The patient's level of consciousness and physiologic status was continually monitored with pulse oximetry, EKG from 1108 to 1125.  There were no complications or adverse events during sedation.  After the sedation patient was taken to the recovery area.      PROCEDURE NOTE: A pre-procedural time out was performed to confirm the correct patient, procedure, side, and site. Standard ASA monitors were applied and oxygen via nasal cannula was provided. All proceduralists donned sterile gloves with masks and surgical hats. The patient was placed prone with pillow under the abdomen and all pressure points padded. The patient's lumbar spine was prepped in standard fashion using Chlorhexidine and draped with sterile towels. The target neuroforamen was identified using oblique fluoroscopy and the superior vertebral body endplate squared. The overlying skin and subcutaneous tissue was anesthetized with 1% lidocaine. A 22 gauge 3.5 inch spinal needle with bent tip was incrementally advanced using intermittent fluoroscopy to the 6 o'clock position of the target pedicle in the mid-neuroforamen using  oblique, AP and lateral intermittent fluoroscopy. After negative aspiration of blood and cerebrospinal fluid, needle placement was confirmed with 1 ml of omnipaque 180 mgI/ml contrast using AP fluoroscopic imaging. Imaging revealed a clear outline of the target spinal nerve with proximal spread of agent through the neuroforamen medially to the epidural space, without evidence of intravascular or intrathecal spread. After negative aspiration, a mixture containing dexamethasone 5 mg steroid and lidocaine 1% - 1 ml local anesthetic for a total volume of 1.5 ml was injected under direct visualization with fluoroscopy. The needle was flushed, removed and a bandage applied. The same procedure utilizing the same technique was performed at each target level listed above.    EBL: None     COMPLICATIONS: None     The patient was monitored in recovery area until all discharge criteria were met. Vital signs remained stable throughout the procedure and in the recovery area. There were no immediate complications and the patient tolerated the procedure well. Sensory and motor exam was unchanged from baseline. The patient received written discharge instructions prior to discharge.     FOLLOW UP: As scheduled     ADDITIONAL NOTES: []        [facility]       Mohamud Bell MD     CODES:  [id number]  [id number]  [id number]

## 2024-07-25 ENCOUNTER — TELEPHONE (OUTPATIENT)
Dept: PAIN MEDICINE | Facility: CLINIC | Age: 79
End: 2024-07-25
Payer: MEDICARE

## 2024-07-25 DIAGNOSIS — M54.16 LUMBAR RADICULOPATHY: Primary | ICD-10-CM

## 2024-08-12 ENCOUNTER — OFFICE VISIT (OUTPATIENT)
Dept: PAIN MEDICINE | Facility: CLINIC | Age: 79
End: 2024-08-12
Payer: MEDICARE

## 2024-08-12 VITALS — HEIGHT: 67 IN | WEIGHT: 162 LBS | BODY MASS INDEX: 25.43 KG/M2

## 2024-08-12 DIAGNOSIS — M54.16 LUMBAR RADICULOPATHY: Primary | ICD-10-CM

## 2024-08-12 PROCEDURE — 99214 OFFICE O/P EST MOD 30 MIN: CPT | Performed by: STUDENT IN AN ORGANIZED HEALTH CARE EDUCATION/TRAINING PROGRAM

## 2024-08-12 PROCEDURE — 1125F AMNT PAIN NOTED PAIN PRSNT: CPT | Performed by: STUDENT IN AN ORGANIZED HEALTH CARE EDUCATION/TRAINING PROGRAM

## 2024-08-12 PROCEDURE — G2211 COMPLEX E/M VISIT ADD ON: HCPCS | Performed by: STUDENT IN AN ORGANIZED HEALTH CARE EDUCATION/TRAINING PROGRAM

## 2024-08-12 PROCEDURE — 1159F MED LIST DOCD IN RCRD: CPT | Performed by: STUDENT IN AN ORGANIZED HEALTH CARE EDUCATION/TRAINING PROGRAM

## 2024-08-12 PROCEDURE — 1160F RVW MEDS BY RX/DR IN RCRD: CPT | Performed by: STUDENT IN AN ORGANIZED HEALTH CARE EDUCATION/TRAINING PROGRAM

## 2024-08-12 RX ORDER — PREGABALIN 50 MG/1
50 CAPSULE ORAL 2 TIMES DAILY
Qty: 60 CAPSULE | Refills: 2 | Status: SHIPPED | OUTPATIENT
Start: 2024-08-12

## 2024-08-12 NOTE — PROGRESS NOTES
Primary Physician: Jayce Valdez MD    Initial HPI 5/31/2023:  Mr. Nhi Hager is 79 y.o. male who presents as a new patient referral for evaluation treatment of chronic low back pain with radiation right lower extremity.  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  He states that he was in his normal state of health with moderate intermittent low back pain when he was dismounting from a horse approximately 2 months ago and suffered acute onset low back pain with radiation of the right lower extremity.  He describes numbness and tingling and pain from his right buttock down the posterior lateral aspect of his thigh and shin calf into the lateral aspect of his foot and small toe.  Patient denies any bowel or bladder dysfunction, lower extremity weakness, new onset saddle anesthesia or unexplained weight loss.   He has trialed nightly pregabalin which did cause some morning sedation.  He is engaged in physical therapy which has been helpful.  Unfortunately his pain has limited his activities of daily living, sleep, ability to ambulate.  He has additionally trialed Tylenol, NSAIDs, tramadol, Medrol Dosepak.  The Medrol Dosepak was quite helpful.  He additionally sought consultation with neurosurgery, Dr. Yakov Pal, who recommended conservative management.    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  He is without pain today.  He has been doing well.  He had been scheduled for a stimulator trial however we will hold off as he is without pain at this time.    6/15/23: Right L5/S1 and S1 lumbar Transforaminal Epidural Steroid Injection with 100% resolution for 1 week  7/27/2023: Right L4/5 and L5/S1 TFESI with 80% relief ongoing (6 months so far)  3/13/2024: Right L4/5 and L5/S1 TFESI with 80% relief for 4 months  7/11/2024: Right L4/5 and L5/S1 TFESI with 100% relief ongoing    Objective  Pain Scoring:   BRIEF PAIN INVENTORY:  Total score:   Pain Score    08/12/24 1203   PainSc:   1   PainLoc: Back      PHQ-2: PHQ-2 Total Score: 0  PHQ-9: PHQ-9: Brief Depression Severity Measure Score: 0  Opioid Risk Tool:         Review of Systems:   ROS negative except as otherwise noted     Past Medical History:   Past Medical History:   Diagnosis Date    Abnormal ECG 2000    Acute right-sided low back pain with bilateral sciatica 7/26/2023    Anemia 2022    Arthritis     Balance problem     vertigo    Chronic pain disorder 6-8 mos    Variable sites and times    Coronary artery disease 2000    Decreased lung capacity     Diabetes mellitus     checks sugar once per week     Extremity pain 4 mos    Seems SI joint related    GERD (gastroesophageal reflux disease)     Gout     Hepatitis A     History of transfusion     NO REACTION     Hyperlipidemia     Hypertension     Joint pain 1999    Hip and shoulders have been relpaced    Low back pain Three mos or more    Lumbosacral disc disease 6 weeks or more    SI joint pain primarily    Osteoarthritis Ten years    Premature ventricular contractions (PVCs) (VPCs)     OCCA    Spinal stenosis Noted on x rays    Vertigo     h/o    Wears glasses          Past Surgical History:   Past Surgical History:   Procedure Laterality Date    ACHILLES TENDON SURGERY      right     CATARACT EXTRACTION      bilat     COLONOSCOPY      CORONARY ARTERY BYPASS GRAFT      4 vessles     EPIDURAL BLOCK  2023    HERNIA REPAIR      bilat inguinal    INTERVENTIONAL RADIOLOGY PROCEDURE N/A 08/23/2023    Procedure: IR myelogram lumbar with fluoroscopy;  Surgeon: Ruslan Umana MD;  Location: [facility] INVASIVE LOCATION;  Service: Interventional Radiology;  Laterality: N/A;    JOINT REPLACEMENT      bila hip     ORTHOPEDIC SURGERY  2000    Joint and shoulder replacements    OTHER SURGICAL HISTORY      MYELOGRAM 08/23/2023 PER DR. UMANA    SHOULDER ARTHROTOMY      bilat     SINUS SURGERY      just  cleaned out     TONSILLECTOMY      TOTAL SHOULDER ARTHROPLASTY Left 01/04/2021    Procedure: TOTAL SHOULDER ARTHROPLASTY, LEFT BICEPS TENODESIS;  Surgeon: Sundar Miranda MD;  Location:  [facility];  Service: Orthopedics;  Laterality: Left;    TOTAL SHOULDER ARTHROPLASTY W/ DISTAL CLAVICLE EXCISION Right 12/02/2019    Procedure: REVERSE TOTAL SHOULDER ARTHROPLASTY RIGHT;  Surgeon: Sundar Miranda MD;  Location:  [facility];  Service: Orthopedics    VASCULAR SURGERY  Heart 2000    VASECTOMY      WISDOM TOOTH EXTRACTION           Family History   Family History   Problem Relation Age of Onset    Hyperlipidemia Mother     Hypertension Mother     Arthritis Mother         DM    Diabetes Mother     Osteoporosis Mother     Hyperlipidemia Brother     Hypertension Brother     Coronary artery disease Brother     Mental illness Brother     Hyperlipidemia Father          Social History   Social History     Socioeconomic History    Marital status:    Tobacco Use    Smoking status: Never    Smokeless tobacco: Never   Vaping Use    Vaping status: Never Used   Substance and Sexual Activity    Alcohol use: Yes     Alcohol/week: 5.0 - 6.0 standard drinks of alcohol     Types: 1 - 2 Glasses of wine, 2 Cans of beer, 2 Drinks containing 0.5 oz of alcohol per week     Comment: social     Drug use: No    Sexual activity: Not Currently     Partners: Female     Birth control/protection: Vasectomy     Comment:         Medications:     Current Outpatient Medications:     acetaminophen (TYLENOL) 500 MG tablet, Take 1 tablet by mouth Every 6 (Six) Hours As Needed for Mild Pain., Disp: , Rfl:     alfuzosin (UROXATRAL) 10 MG 24 hr tablet, Daily., Disp: , Rfl:     allopurinol (ZYLOPRIM) 100 MG tablet, Take 2 tablets by mouth Daily., Disp: , Rfl:     amLODIPine (NORVASC) 5 MG tablet, Take 1 tablet by mouth Daily., Disp: , Rfl:     diphenhydrAMINE-acetaminophen (Tylenol PM Extra Strength)  MG tablet per tablet, At Night  "As Needed., Disp: , Rfl:     fluticasone (FLONASE) 50 MCG/ACT nasal spray, 2 sprays into the nostril(s) as directed by provider Daily., Disp: , Rfl:     glipizide (GLUCOTROL XL) 5 MG ER tablet, Take 1 tablet by mouth 2 (Two) Times a Day., Disp: , Rfl:     indapamide (LOZOL) 2.5 MG tablet, Take 1 tablet by mouth Daily., Disp: , Rfl:     metoprolol succinate XL (TOPROL-XL) 50 MG 24 hr tablet, Take 1 tablet by mouth Daily., Disp: , Rfl:     mometasone (ELOCON) 0.1 % solution, APPLY FEW DROPS TOPICALLY TO THE AFFECTED AREA DAILY, Disp: , Rfl:     multivitamin with minerals tablet tablet, Take 1 tablet by mouth Daily., Disp: , Rfl:     Myrbetriq 50 MG tablet sustained-release 24 hour 24 hr tablet, Take 50 mg by mouth Daily., Disp: , Rfl:     omeprazole (priLOSEC) 20 MG capsule, Take 1 capsule by mouth Daily., Disp: , Rfl:     OneTouch Ultra test strip, TEST BLOOD SUGAR 1 - 3 TIMES PER DAY, Disp: , Rfl:     oxybutynin XL (DITROPAN-XL) 5 MG 24 hr tablet, Take 1 tablet by mouth Daily., Disp: , Rfl:     pregabalin (LYRICA) 50 MG capsule, Take 1 capsule by mouth 2 (Two) Times a Day., Disp: 60 capsule, Rfl: 2    rosuvastatin (CRESTOR) 40 MG tablet, Take 1 tablet by mouth Daily., Disp: , Rfl:         Physical Exam:     Vitals:    08/12/24 1203   Weight: 73.5 kg (162 lb)   Height: 170.2 cm (67\")   PainSc:   1   PainLoc: Back        General: Alert and oriented, No acute distress.   HEENT: Normocephalic, atraumatic.   Cardiovascular: No gross edema  Respiratory: Respirations are non-labored    TTP right GTB    Motor Exam:    Strength: Rate on 1-5 scale Right Left    L1/2- hip flexion 5 5    L3- knee extension 5 5    L4- ankle dorsiflexion 5 5    L5- great toe extension 5 5    S1- ankle plantarflexion 5 5    Sensory Exam: Decreased sensation to light touch in right S1 dermatome.    Neurologic: Cranial Nerves II-XII are grossly intact.   Clonus -negative bilaterally    Psychiatric: Cooperative.   Gait: Antalgic  Assistive Devices: " None    Imaging Studies:   Results for orders placed during the hospital encounter of 05/20/23    MRI Lumbar Spine Without Contrast    Narrative  MRI LUMBAR SPINE WO CONTRAST    Date of Exam: 5/20/2023 9:04 AM EDT    Indication: spinal stenosis.    Comparison: None available.    Technique:  Routine multiplanar/multisequence sequence images of the lumbar spine were obtained without contrast administration.    Findings:  There is diffuse marrow hyperintensity present, suggesting demineralization, otherwise without evidence of fracture or suspicious marrow replacing lesion. Straightening is present in addition to minimal retrolisthesis of L2 on L3 and L3 on L4. The conus  medullaris and cauda equina nerve roots are satisfactory in appearance. The paraspinal soft tissues demonstrate no acute or suspicious findings. Multilevel spondylosis is present,, with contributing component of epidural lipomatosis, with areas of  involvement including    L1-2, small disc bulge and bilateral facet arthropathy. There is mild spinal canal stenosis and moderate bilateral neuroforaminal narrowing, greater on the right.    L2-3, small disc bulge and bilateral facet arthropathy. There is mild to moderate spinal canal narrowing in addition to moderate to severe left and severe right neuroforaminal narrowing.    L3-4, small disc bulge and bilateral facet arthropathy. There is moderate spinal canal stenosis in addition to severe left and moderate to severe right neuroforaminal narrowing.    L4-5, small disc bulge, bilateral facet arthropathy and component of epidural lipomatosis. There is moderate spinal canal narrowing in addition to severe left and moderate to severe right neuroforaminal stenosis.    L5-S1, small disc bulge and bilateral facet arthropathy, with contributing component of epidural lipomatosis. There is resultant moderate spinal canal narrowing in addition to severe bilateral neuroforaminal  stenosis.    Impression  Impression:  Multilevel lumbar spondylosis is present, with prominent component of multilevel advanced facet arthropathy. There is resultant multilevel severe neuroforaminal narrowing present as above. Somewhat generalized moderate multilevel spinal canal stenosis is  present.    Electronically Signed: Paulino Vera  5/21/2023 7:14 AM EDT  Workstation ID: [id number]      Impression & Plan:   5/31/2023: Nhi Hager is a 79 y.o. male with past medical history significant for DM, CAD, GERD, gout, HTN who presents to the pain clinic for evaluation and treatment of chronic right-sided low back pain with radiation of the right lower extremity.  I personally reviewed his lumbar MRI dated 5/20/2023: Scoliosis, multilevel severe degenerative disc disease with Modic 2 endplate changes throughout lumbar spine; no significant canal stenosis; multilevel bilateral neuroforaminal stenosis.  Clinical examination most consistent with right L5, S1 radiculopathy which correlates with right-sided L5/S1 neuroforaminal and lateral recess stenosis secondary to disc herniation and juxta facet hypertrophy/cyst.  We discussed epidural steroid injection to improve pain.  If greater than 50% relief for at least 2-3 months can consider repeat as needed every 3 to 4 months.  I had an in-depth discussion with the patient regarding the risks of the procedure including bleeding, infection, damage to surrounding structures, paralysis.  We discussed the potential adverse effects of corticosteroid injection including flushing of the face, lipodystrophy, skin discoloration, elevated blood glucose, increased blood pressure.  Risks of frequent steroid administration include weight gain, hormonal changes, mood changes, osteoporosis.  7/12/2023: Excellent but transient benefit with epidural.  Will refer back to Dr. Pal for surgical consideration.  Hip MRI negative.  1/16/2024: Continued benefit from TFESI.  Can follow-up as  needed   7/8/2024: Good treatment benefit with repeat TFESI.  Will add pregabalin.  Additionally discussed SCS trial and patient would like to proceed.  8/12/2024: Again excellent benefit with TFESI, pregabalin.  Will hold off on SCS trial for now      1. Lumbar radiculopathy                PLAN:  1. Medication Recommendations: Recommend Voltaren topical, NSAIDs, Tylenol.  Can trial turmeric 500 mg twice daily if NSAID contraindicated.  -Refill pregabalin 50 mg twice daily 60 pills 2 refills  -As part of this patient's treatment plan, patient will be prescribed controlled substances. The patient has been made aware of appropriate use of such medications, including potential risk of somnolence, limited ability to drive and /or work safely, and potential for dependence or overdose. It has also been made clear that these medications are for use by this patient only, without concomitant use of alcohol or other substances unless prescribed.Controlled substance status of medication discussed with patient, discussed risks of medication including abuse potential and diversion potential and need to follow up for reevaluation appointment in order to receive further refills.  Mahendra was reviewed and compliant.    2. Physical Therapy: Continue HEP    3. Psychological: Received psych clearance for SCS    4. Complementary and alternative (CAM) Therapies:     5. Labs: None indicated     6. Imaging: None.      7. Interventions: Cancel SCS trial with Abbott given no pain at this time.  Can consider trial if pain returns    8. Referrals: None    9. Records:     10. Lifestyle goals:    Follow-up 3 months      [facility]  Mohamud Bell MD

## 2024-11-12 ENCOUNTER — OFFICE VISIT (OUTPATIENT)
Dept: PAIN MEDICINE | Facility: CLINIC | Age: 79
End: 2024-11-12
Payer: MEDICARE

## 2024-11-12 VITALS — HEIGHT: 67 IN | WEIGHT: 165.3 LBS | BODY MASS INDEX: 25.94 KG/M2

## 2024-11-12 DIAGNOSIS — Z51.81 THERAPEUTIC DRUG MONITORING: Primary | ICD-10-CM

## 2024-11-12 DIAGNOSIS — M54.16 LUMBAR RADICULOPATHY: Primary | ICD-10-CM

## 2024-11-12 RX ORDER — PREGABALIN 50 MG/1
50 CAPSULE ORAL DAILY
Qty: 30 CAPSULE | Refills: 3 | Status: SHIPPED | OUTPATIENT
Start: 2024-11-19

## 2024-11-12 NOTE — PROGRESS NOTES
Primary Physician: Jayce Valdez MD    Initial HPI 5/31/2023:  Mr. Nhi Hager is 79 y.o. male who presents as a new patient referral for evaluation treatment of chronic low back pain with radiation right lower extremity.  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  He states that he was in his normal state of health with moderate intermittent low back pain when he was dismounting from a horse approximately 2 months ago and suffered acute onset low back pain with radiation of the right lower extremity.  He describes numbness and tingling and pain from his right buttock down the posterior lateral aspect of his thigh and shin calf into the lateral aspect of his foot and small toe.  Patient denies any bowel or bladder dysfunction, lower extremity weakness, new onset saddle anesthesia or unexplained weight loss.   He has trialed nightly pregabalin which did cause some morning sedation.  He is engaged in physical therapy which has been helpful.  Unfortunately his pain has limited his activities of daily living, sleep, ability to ambulate.  He has additionally trialed Tylenol, NSAIDs, tramadol, Medrol Dosepak.  The Medrol Dosepak was quite helpful.  He additionally sought consultation with neurosurgery, Dr. Yakov Pal, who recommended conservative management.    Interval history: Dr. Hager returns to clinic today.  He continues to report good pain relief from his most recent right transforaminal epidural steroid injection.  Additionally, he has been performing stretches at night with good relief.  He has previously been prescribed pregabalin 50 mg twice daily.  He is unsure of the relief this has provided him and has cut down to daily dosing.  He has not noticed a return or increase in his symptoms since decreasing to daily dosing.  He is pleased with the relief that he has received.  He was initially set up for a spinal cord stimulator trial however this was  canceled due to improvement with conservative measures.     Interventions:   6/15/23: Right L5/S1 and S1 lumbar Transforaminal Epidural Steroid Injection with 100% resolution for 1 week  7/27/2023: Right L4/5 and L5/S1 TFESI with 80% relief ongoing (6 months so far)  3/13/2024: Right L4/5 and L5/S1 TFESI with 80% relief for 4 months  7/11/2024: Right L4/5 and L5/S1 TFESI with 100% relief ongoing    Objective Pain Scoring:   BRIEF PAIN INVENTORY:  Total score:   Pain Score    11/12/24 0955   PainSc: 0-No pain   PainLoc: Back      PHQ-2:    PHQ-9:    Opioid Risk Tool:         Review of Systems:   ROS negative except as otherwise noted     Past Medical History:   Past Medical History:   Diagnosis Date    Abnormal ECG 2000    Acute right-sided low back pain with bilateral sciatica 7/26/2023    Anemia 2022    Arthritis     Balance problem     vertigo    Chronic pain disorder 6-8 mos    Variable sites and times    Coronary artery disease 2000    Decreased lung capacity     Diabetes mellitus     checks sugar once per week     Extremity pain 4 mos    Seems SI joint related    GERD (gastroesophageal reflux disease)     Gout     Hepatitis A     History of transfusion     NO REACTION     Hyperlipidemia     Hypertension     Joint pain 1999    Hip and shoulders have been relpaced    Low back pain Three mos or more    Lumbosacral disc disease 6 weeks or more    SI joint pain primarily    Osteoarthritis Ten years    Premature ventricular contractions (PVCs) (VPCs)     OCCA    Spinal stenosis Noted on x rays    Vertigo     h/o    Wears glasses          Past Surgical History:   Past Surgical History:   Procedure Laterality Date    ACHILLES TENDON SURGERY      right     CATARACT EXTRACTION      bilat     COLONOSCOPY      CORONARY ARTERY BYPASS GRAFT      4 vessles     EPIDURAL BLOCK  2023    HERNIA REPAIR      bilat inguinal    INTERVENTIONAL RADIOLOGY PROCEDURE N/A 08/23/2023    Procedure: IR myelogram lumbar with fluoroscopy;   Surgeon: Ruslan Umana MD;  Location:  [facility];  Service: Interventional Radiology;  Laterality: N/A;    JOINT REPLACEMENT      bila hip     ORTHOPEDIC SURGERY  2000    Joint and shoulder replacements    OTHER SURGICAL HISTORY      MYELOGRAM 08/23/2023 PER DR. UMANA    SHOULDER ARTHROTOMY      bilat     SINUS SURGERY      just cleaned out     TONSILLECTOMY      TOTAL SHOULDER ARTHROPLASTY Left 01/04/2021    Procedure: TOTAL SHOULDER ARTHROPLASTY, LEFT BICEPS TENODESIS;  Surgeon: Sundar Miranda MD;  Location:  [facility];  Service: Orthopedics;  Laterality: Left;    TOTAL SHOULDER ARTHROPLASTY W/ DISTAL CLAVICLE EXCISION Right 12/02/2019    Procedure: REVERSE TOTAL SHOULDER ARTHROPLASTY RIGHT;  Surgeon: Sundar Miranda MD;  Location:  [facility];  Service: Orthopedics    VASCULAR SURGERY  Heart 2000    VASECTOMY      WISDOM TOOTH EXTRACTION           Family History   Family History   Problem Relation Age of Onset    Hyperlipidemia Mother     Hypertension Mother     Arthritis Mother         DM    Diabetes Mother     Osteoporosis Mother     Hyperlipidemia Brother     Hypertension Brother     Coronary artery disease Brother     Mental illness Brother     Hyperlipidemia Father          Social History   Social History     Socioeconomic History    Marital status:    Tobacco Use    Smoking status: Never    Smokeless tobacco: Never   Vaping Use    Vaping status: Never Used   Substance and Sexual Activity    Alcohol use: Yes     Alcohol/week: 5.0 - 6.0 standard drinks of alcohol     Types: 1 - 2 Glasses of wine, 2 Cans of beer, 2 Drinks containing 0.5 oz of alcohol per week     Comment: social     Drug use: No    Sexual activity: Not Currently     Partners: Female     Birth control/protection: Vasectomy     Comment:         Medications:     Current Outpatient Medications:     acetaminophen (TYLENOL) 500 MG tablet, Take 1 tablet by mouth Every 6 (Six) Hours As Needed for Mild  "Pain., Disp: , Rfl:     alfuzosin (UROXATRAL) 10 MG 24 hr tablet, Daily., Disp: , Rfl:     allopurinol (ZYLOPRIM) 100 MG tablet, Take 2 tablets by mouth Daily., Disp: , Rfl:     amLODIPine (NORVASC) 5 MG tablet, Take 1 tablet by mouth Daily., Disp: , Rfl:     diphenhydrAMINE-acetaminophen (Tylenol PM Extra Strength)  MG tablet per tablet, At Night As Needed., Disp: , Rfl:     fluticasone (FLONASE) 50 MCG/ACT nasal spray, Administer 2 sprays into the nostril(s) as directed by provider Daily., Disp: , Rfl:     glipizide (GLUCOTROL XL) 5 MG ER tablet, Take 1 tablet by mouth 2 (Two) Times a Day., Disp: , Rfl:     indapamide (LOZOL) 2.5 MG tablet, Take 1 tablet by mouth Daily., Disp: , Rfl:     metoprolol succinate XL (TOPROL-XL) 50 MG 24 hr tablet, Take 1 tablet by mouth Daily., Disp: , Rfl:     mometasone (ELOCON) 0.1 % solution, APPLY FEW DROPS TOPICALLY TO THE AFFECTED AREA DAILY, Disp: , Rfl:     multivitamin with minerals tablet tablet, Take 1 tablet by mouth Daily., Disp: , Rfl:     Myrbetriq 50 MG tablet sustained-release 24 hour 24 hr tablet, Take 50 mg by mouth Daily., Disp: , Rfl:     omeprazole (priLOSEC) 20 MG capsule, Take 1 capsule by mouth Daily., Disp: , Rfl:     OneTouch Ultra test strip, TEST BLOOD SUGAR 1 - 3 TIMES PER DAY, Disp: , Rfl:     oxybutynin XL (DITROPAN-XL) 5 MG 24 hr tablet, Take 1 tablet by mouth Daily., Disp: , Rfl:     rosuvastatin (CRESTOR) 40 MG tablet, Take 1 tablet by mouth Daily., Disp: , Rfl:     pregabalin (LYRICA) 50 MG capsule, Take 1 capsule by mouth 2 (Two) Times a Day. (Patient not taking: Reported on 11/12/2024), Disp: 60 capsule, Rfl: 2        Physical Exam:     Vitals:    11/12/24 0955   Weight: 75 kg (165 lb 4.8 oz)   Height: 170.2 cm (67.01\")   PainSc: 0-No pain   PainLoc: Back        General: Alert and oriented, No acute distress.   HEENT: Normocephalic, atraumatic.   Cardiovascular: No gross edema  Respiratory: Respirations are non-labored    TTP right " GTB    Motor Exam:    Strength: Rate on 1-5 scale Right Left    L1/2- hip flexion 5 5    L3- knee extension 5 5    L4- ankle dorsiflexion 5 5    L5- great toe extension 5 5    S1- ankle plantarflexion 5 5    Sensory Exam: Decreased sensation to light touch in right S1 dermatome.    Neurologic: Cranial Nerves II-XII are grossly intact.   Clonus -negative bilaterally    Psychiatric: Cooperative.   Gait: Antalgic  Assistive Devices: None    Imaging Studies:   Results for orders placed during the hospital encounter of 05/20/23    MRI Lumbar Spine Without Contrast    Narrative  MRI LUMBAR SPINE WO CONTRAST    Date of Exam: 5/20/2023 9:04 AM EDT    Indication: spinal stenosis.    Comparison: None available.    Technique:  Routine multiplanar/multisequence sequence images of the lumbar spine were obtained without contrast administration.    Findings:  There is diffuse marrow hyperintensity present, suggesting demineralization, otherwise without evidence of fracture or suspicious marrow replacing lesion. Straightening is present in addition to minimal retrolisthesis of L2 on L3 and L3 on L4. The conus  medullaris and cauda equina nerve roots are satisfactory in appearance. The paraspinal soft tissues demonstrate no acute or suspicious findings. Multilevel spondylosis is present,, with contributing component of epidural lipomatosis, with areas of  involvement including    L1-2, small disc bulge and bilateral facet arthropathy. There is mild spinal canal stenosis and moderate bilateral neuroforaminal narrowing, greater on the right.    L2-3, small disc bulge and bilateral facet arthropathy. There is mild to moderate spinal canal narrowing in addition to moderate to severe left and severe right neuroforaminal narrowing.    L3-4, small disc bulge and bilateral facet arthropathy. There is moderate spinal canal stenosis in addition to severe left and moderate to severe right neuroforaminal narrowing.    L4-5, small disc bulge,  bilateral facet arthropathy and component of epidural lipomatosis. There is moderate spinal canal narrowing in addition to severe left and moderate to severe right neuroforaminal stenosis.    L5-S1, small disc bulge and bilateral facet arthropathy, with contributing component of epidural lipomatosis. There is resultant moderate spinal canal narrowing in addition to severe bilateral neuroforaminal stenosis.    Impression  Impression:  Multilevel lumbar spondylosis is present, with prominent component of multilevel advanced facet arthropathy. There is resultant multilevel severe neuroforaminal narrowing present as above. Somewhat generalized moderate multilevel spinal canal stenosis is  present.    Electronically Signed: Paulino Vera  5/21/2023 7:14 AM EDT  Workstation ID: [id number]      Impression & Plan:   5/31/2023: Nhi Hager is a 79 y.o. male with past medical history significant for DM, CAD, GERD, gout, HTN who presents to the pain clinic for evaluation and treatment of chronic right-sided low back pain with radiation of the right lower extremity.  I personally reviewed his lumbar MRI dated 5/20/2023: Scoliosis, multilevel severe degenerative disc disease with Modic 2 endplate changes throughout lumbar spine; no significant canal stenosis; multilevel bilateral neuroforaminal stenosis.  Clinical examination most consistent with right L5, S1 radiculopathy which correlates with right-sided L5/S1 neuroforaminal and lateral recess stenosis secondary to disc herniation and juxta facet hypertrophy/cyst.  We discussed epidural steroid injection to improve pain.  If greater than 50% relief for at least 2-3 months can consider repeat as needed every 3 to 4 months.  I had an in-depth discussion with the patient regarding the risks of the procedure including bleeding, infection, damage to surrounding structures, paralysis.  We discussed the potential adverse effects of corticosteroid injection including flushing of the  face, lipodystrophy, skin discoloration, elevated blood glucose, increased blood pressure.  Risks of frequent steroid administration include weight gain, hormonal changes, mood changes, osteoporosis.  7/12/2023: Excellent but transient benefit with epidural.  Will refer back to Dr. Pal for surgical consideration.  Hip MRI negative.  1/16/2024: Continued benefit from TFESI.  Can follow-up as needed   7/8/2024: Good treatment benefit with repeat TFESI.  Will add pregabalin.  Additionally discussed SCS trial and patient would like to proceed.  8/12/2024: Again excellent benefit with TFESI, pregabalin.  Will hold off on SCS trial for now  11/12/2024: Excellent relief from TFESI and at home stretching continued.  Will decrease pregabalin dosing to daily rather than BID.       1. Lumbar radiculopathy                  PLAN:  1. Medication Recommendations: Recommend Voltaren topical, NSAIDs, Tylenol.  Can trial turmeric 500 mg twice daily if NSAID contraindicated.    -Will contact pharmacy to cancel last remaining refill of pregabalin 50 mg twice daily dosing as he has been taking this medication daily rather than twice daily..  Currently, patient has enough medication to last until 11/24/2024.  Can consider taper as tolerated  -Start pregabalin 50 mg daily dosing, 60 pills, #3 refills  -As part of this patient's treatment plan, patient will be prescribed controlled substances. The patient has been made aware of appropriate use of such medications, including potential risk of somnolence, limited ability to drive and /or work safely, and potential for dependence or overdose. It has also been made clear that these medications are for use by this patient only, without concomitant use of alcohol or other substances unless prescribed.Controlled substance status of medication discussed with patient, discussed risks of medication including abuse potential and diversion potential and need to follow up for reevaluation  appointment in order to receive further refills.  Mahendra was reviewed and compliant.    2. Physical Therapy: Continue HEP    3. Psychological: Received psych clearance for SCS    4. Complementary and alternative (CAM) Therapies:     5. Labs: Obtain compliance UDS    6. Imaging: None.      7. Interventions: Can consider repeat right L4/5 and L5/S1 TFESI if symptoms return.  Can always consider spinal cord stimulator trial if symptoms are refractory to conservative measures and if patient expresses interest in pursuing trial.    8. Referrals: None    9. Records:     10. Lifestyle goals:    Follow-up 4 months      [facility]  Gayle King PA-C

## 2024-11-12 NOTE — TELEPHONE ENCOUNTER
Decrease pregabalin to daily dosing  Contacted pharmacy and canceled last remaining refill of pregabalin twice daily dosing  Start pregabalin 50 mg daily, 30 pills, 3 refills  Mahendra reviewed and compliant  Pending UDS  Controlled substance agreement signed in office  Appropriate follow-up as scheduled

## 2025-03-12 ENCOUNTER — OFFICE VISIT (OUTPATIENT)
Dept: PAIN MEDICINE | Facility: CLINIC | Age: 80
End: 2025-03-12
Payer: MEDICARE

## 2025-03-12 VITALS — HEIGHT: 67 IN | BODY MASS INDEX: 24.96 KG/M2 | WEIGHT: 159 LBS

## 2025-03-12 DIAGNOSIS — M54.16 LUMBAR RADICULOPATHY: Primary | ICD-10-CM

## 2025-03-12 NOTE — PROGRESS NOTES
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 male who presents as a new patient referral for evaluation treatment of chronic low back pain with radiation right lower extremity.  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  He states that he was in his normal state of health with moderate intermittent low back pain when he was dismounting from a horse approximately 2 months ago and suffered acute onset low back pain with radiation of the right lower extremity.  He describes numbness and tingling and pain from his right buttock down the posterior lateral aspect of his thigh and shin calf into the lateral aspect of his foot and small toe.  Patient denies any bowel or bladder dysfunction, lower extremity weakness, new onset saddle anesthesia or unexplained weight loss.   He has trialed nightly pregabalin which did cause some morning sedation.  He is engaged in physical therapy which has been helpful.  Unfortunately his pain has limited his activities of daily living, sleep, ability to ambulate.  He has additionally trialed Tylenol, NSAIDs, tramadol, Medrol Dosepak.  The Medrol Dosepak was quite helpful.  He additionally sought consultation with neurosurgery, Dr. Yakov Pal, who recommended conservative management.    Interval history:   Patient returns to clinic today for medication management.  At his last office visit his pregabalin was reduced to daily.  Today,  he has very little pain on a day-to-day basis.  He continues at home stretching with excellent pain relief.  He has stopped taking his pregabalin as he felt it was not effective for him.  He has not noticed any return in his right lower extremity symptoms.  He does have some pain within the left hip/buttock.    Interventions:   6/15/23: Right L5/S1 and S1 lumbar Transforaminal Epidural Steroid Injection with 100% resolution for 1 week  7/27/2023: Right L4/5 and  L5/S1 TFESI with 80% relief ongoing (6 months so far)  3/13/2024: Right L4/5 and L5/S1 TFESI with 80% relief for 4 months  7/11/2024: Right L4/5 and L5/S1 TFESI with 100% relief ongoing    Objective Pain Scoring:   BRIEF PAIN INVENTORY:  Total score:   Pain Score    03/12/25 0901   PainSc: 1    PainLoc: Back        PHQ-2:    PHQ-9:    Opioid Risk Tool:         Review of Systems:   ROS negative except as otherwise noted     Past Medical History:   Past Medical History:   Diagnosis Date    Abnormal ECG 2000    Acute right-sided low back pain with bilateral sciatica 07/26/2023    Anemia 2022    Arthritis     Balance problem     vertigo    Chronic kidney disease 2019    Secondary to NSAIDs    Chronic pain disorder 6-8 mos    Variable sites and times    Coronary artery disease 2000    Decreased lung capacity     Diabetes mellitus     checks sugar once per week     Extremity pain 4 mos    Seems SI joint related    GERD (gastroesophageal reflux disease)     Gout     Hepatitis A     History of transfusion     NO REACTION     Hyperlipidemia     Hypertension     Joint pain 1999    Hip and shoulders have been relpaced    Low back pain Three mos or more    Lumbosacral disc disease 6 weeks or more    SI joint pain primarily    Osteoarthritis Ten years    Premature ventricular contractions (PVCs) (VPCs)     OCCA    Spinal stenosis Noted on x rays    Vertigo     h/o    Wears glasses          Past Surgical History:   Past Surgical History:   Procedure Laterality Date    ACHILLES TENDON SURGERY      right     CATARACT EXTRACTION      bilat     COLONOSCOPY      CORONARY ARTERY BYPASS GRAFT      4 vessles     EPIDURAL BLOCK  2023    HERNIA REPAIR      bilat inguinal    INTERVENTIONAL RADIOLOGY PROCEDURE N/A 08/23/2023    Procedure: IR myelogram lumbar with fluoroscopy;  Surgeon: Ruslan Umana MD;  Location: [facility] CATH INVASIVE LOCATION;  Service: Interventional Radiology;  Laterality: N/A;    JOINT REPLACEMENT      bila hip      ORTHOPEDIC SURGERY  2000    Joint and shoulder replacements    OTHER SURGICAL HISTORY      MYELOGRAM 08/23/2023 PER DR. WISE    SHOULDER ARTHROTOMY      bilat     SINUS SURGERY      just cleaned out     TONSILLECTOMY      TOTAL SHOULDER ARTHROPLASTY Left 01/04/2021    Procedure: TOTAL SHOULDER ARTHROPLASTY, LEFT BICEPS TENODESIS;  Surgeon: Sundar Miranda MD;  Location:  [facility];  Service: Orthopedics;  Laterality: Left;    TOTAL SHOULDER ARTHROPLASTY W/ DISTAL CLAVICLE EXCISION Right 12/02/2019    Procedure: REVERSE TOTAL SHOULDER ARTHROPLASTY RIGHT;  Surgeon: Sundar Miranda MD;  Location:  [facility];  Service: Orthopedics    VASCULAR SURGERY  Heart 2000    VASECTOMY      WISDOM TOOTH EXTRACTION           Family History   Family History   Problem Relation Age of Onset    Hyperlipidemia Mother     Hypertension Mother     Arthritis Mother         DM    Diabetes Mother     Osteoporosis Mother     Hyperlipidemia Brother     Hypertension Brother     Coronary artery disease Brother     Mental illness Brother     Hyperlipidemia Father          Social History   Social History     Socioeconomic History    Marital status:    Tobacco Use    Smoking status: Never    Smokeless tobacco: Never   Vaping Use    Vaping status: Never Used   Substance and Sexual Activity    Alcohol use: Yes     Alcohol/week: 5.0 - 6.0 standard drinks of alcohol     Types: 1 - 2 Glasses of wine, 2 Cans of beer, 2 Drinks containing 0.5 oz of alcohol per week     Comment: social     Drug use: No    Sexual activity: Not Currently     Partners: Female     Birth control/protection: Vasectomy     Comment:         Medications:     Current Outpatient Medications:     acetaminophen (TYLENOL) 500 MG tablet, Take 1 tablet by mouth Every 6 (Six) Hours As Needed for Mild Pain., Disp: , Rfl:     alfuzosin (UROXATRAL) 10 MG 24 hr tablet, Daily., Disp: , Rfl:     allopurinol (ZYLOPRIM) 100 MG tablet, Take 2 tablets by mouth Daily., Disp: ,  "Rfl:     amLODIPine (NORVASC) 5 MG tablet, Take 1 tablet by mouth Daily., Disp: , Rfl:     diphenhydrAMINE-acetaminophen (Tylenol PM Extra Strength)  MG tablet per tablet, At Night As Needed., Disp: , Rfl:     fluticasone (FLONASE) 50 MCG/ACT nasal spray, Administer 2 sprays into the nostril(s) as directed by provider Daily., Disp: , Rfl:     glipizide (GLUCOTROL XL) 5 MG ER tablet, Take 1 tablet by mouth 2 (Two) Times a Day., Disp: , Rfl:     indapamide (LOZOL) 2.5 MG tablet, Take 1 tablet by mouth Daily., Disp: , Rfl:     metoprolol succinate XL (TOPROL-XL) 50 MG 24 hr tablet, Take 1 tablet by mouth Daily., Disp: , Rfl:     mometasone (ELOCON) 0.1 % solution, APPLY FEW DROPS TOPICALLY TO THE AFFECTED AREA DAILY, Disp: , Rfl:     multivitamin with minerals tablet tablet, Take 1 tablet by mouth Daily., Disp: , Rfl:     Myrbetriq 50 MG tablet sustained-release 24 hour 24 hr tablet, Take 50 mg by mouth Daily., Disp: , Rfl:     omeprazole (priLOSEC) 20 MG capsule, Take 1 capsule by mouth Daily., Disp: , Rfl:     OneTouch Ultra test strip, TEST BLOOD SUGAR 1 - 3 TIMES PER DAY, Disp: , Rfl:     oxybutynin XL (DITROPAN-XL) 5 MG 24 hr tablet, Take 1 tablet by mouth Daily., Disp: , Rfl:     rosuvastatin (CRESTOR) 40 MG tablet, Take 1 tablet by mouth Daily., Disp: , Rfl:         Physical Exam:     Vitals:    03/12/25 0901   Weight: 72.1 kg (159 lb)   Height: 170.2 cm (67.01\")   PainSc: 1    PainLoc: Back          General: Alert and oriented, No acute distress.   HEENT: Normocephalic, atraumatic.   Cardiovascular: No gross edema  Respiratory: Respirations are non-labored    TTP right GTB    Motor Exam:    Strength: Rate on 1-5 scale Right Left    L1/2- hip flexion 5 5    L3- knee extension 5 5    L4- ankle dorsiflexion 5 5    L5- great toe extension 5 5    S1- ankle plantarflexion 5 5    Sensory Exam: Decreased sensation to light touch in right S1 dermatome.    Neurologic: Cranial Nerves II-XII are grossly intact. "   Clonus -negative bilaterally    Psychiatric: Cooperative.   Gait: Antalgic  Assistive Devices: None    Imaging Studies:   Results for orders placed during the hospital encounter of 05/20/23    MRI Lumbar Spine Without Contrast    Narrative  MRI LUMBAR SPINE WO CONTRAST    Date of Exam: 5/20/2023 9:04 AM EDT    Indication: spinal stenosis.    Comparison: None available.    Technique:  Routine multiplanar/multisequence sequence images of the lumbar spine were obtained without contrast administration.    Findings:  There is diffuse marrow hyperintensity present, suggesting demineralization, otherwise without evidence of fracture or suspicious marrow replacing lesion. Straightening is present in addition to minimal retrolisthesis of L2 on L3 and L3 on L4. The conus  medullaris and cauda equina nerve roots are satisfactory in appearance. The paraspinal soft tissues demonstrate no acute or suspicious findings. Multilevel spondylosis is present,, with contributing component of epidural lipomatosis, with areas of  involvement including    L1-2, small disc bulge and bilateral facet arthropathy. There is mild spinal canal stenosis and moderate bilateral neuroforaminal narrowing, greater on the right.    L2-3, small disc bulge and bilateral facet arthropathy. There is mild to moderate spinal canal narrowing in addition to moderate to severe left and severe right neuroforaminal narrowing.    L3-4, small disc bulge and bilateral facet arthropathy. There is moderate spinal canal stenosis in addition to severe left and moderate to severe right neuroforaminal narrowing.    L4-5, small disc bulge, bilateral facet arthropathy and component of epidural lipomatosis. There is moderate spinal canal narrowing in addition to severe left and moderate to severe right neuroforaminal stenosis.    L5-S1, small disc bulge and bilateral facet arthropathy, with contributing component of epidural lipomatosis. There is resultant moderate spinal  canal narrowing in addition to severe bilateral neuroforaminal stenosis.    Impression  Impression:  Multilevel lumbar spondylosis is present, with prominent component of multilevel advanced facet arthropathy. There is resultant multilevel severe neuroforaminal narrowing present as above. Somewhat generalized moderate multilevel spinal canal stenosis is  present.    Electronically Signed: Paulino Vera  5/21/2023 7:14 AM EDT  Workstation ID: [id number]      Impression & Plan:   5/31/2023: Nhi Hager is a 79 y.o. male with past medical history significant for DM, CAD, GERD, gout, HTN who presents to the pain clinic for evaluation and treatment of chronic right-sided low back pain with radiation of the right lower extremity.  I personally reviewed his lumbar MRI dated 5/20/2023: Scoliosis, multilevel severe degenerative disc disease with Modic 2 endplate changes throughout lumbar spine; no significant canal stenosis; multilevel bilateral neuroforaminal stenosis.  Clinical examination most consistent with right L5, S1 radiculopathy which correlates with right-sided L5/S1 neuroforaminal and lateral recess stenosis secondary to disc herniation and juxta facet hypertrophy/cyst.  We discussed epidural steroid injection to improve pain.  If greater than 50% relief for at least 2-3 months can consider repeat as needed every 3 to 4 months.  I had an in-depth discussion with the patient regarding the risks of the procedure including bleeding, infection, damage to surrounding structures, paralysis.  We discussed the potential adverse effects of corticosteroid injection including flushing of the face, lipodystrophy, skin discoloration, elevated blood glucose, increased blood pressure.  Risks of frequent steroid administration include weight gain, hormonal changes, mood changes, osteoporosis.  7/12/2023: Excellent but transient benefit with epidural.  Will refer back to Dr. Pal for surgical consideration.  Hip MRI  negative.  1/16/2024: Continued benefit from TFESI.  Can follow-up as needed   7/8/2024: Good treatment benefit with repeat TFESI.  Will add pregabalin.  Additionally discussed SCS trial and patient would like to proceed.  8/12/2024: Again excellent benefit with TFESI, pregabalin.  Will hold off on SCS trial for now  11/12/2024: Excellent relief from TFESI and at home stretching continued.  Will decrease pregabalin dosing to daily rather than BID.   3/12/2025: Excellent relief from TFESI and at home stretching continued.  Discontinue pregabalin.      1. Lumbar radiculopathy            PLAN:  1. Medication Recommendations: Recommend Voltaren topical, NSAIDs, Tylenol.  Can trial turmeric 500 mg twice daily if NSAID contraindicated.    Discontinue pregabalin    2. Physical Therapy: Continue HEP    3. Psychological: Received psych clearance for SCS    4. Complementary and alternative (CAM) Therapies:     5. Labs: Obtain compliance UDS    6. Imaging: None.      7. Interventions: Can consider repeat right L4/5 and L5/S1 TFESI if symptoms return.    -Can always consider spinal cord stimulator trial if symptoms are refractory to conservative measures and if patient expresses interest in pursuing trial.    8. Referrals: None    9. Records:     10. Lifestyle goals:    Follow-up 6 months; sooner if clinically dictated      [facility]  Gayle King PA-C

## 2025-06-02 ENCOUNTER — TELEPHONE (OUTPATIENT)
Dept: PAIN MEDICINE | Facility: CLINIC | Age: 80
End: 2025-06-02
Payer: MEDICARE

## 2025-06-02 NOTE — TELEPHONE ENCOUNTER
HUB [employer]ay     LVM to da 9/11 appt for 6 month follow up/medication management. Provider moving to [place] location Tues/ Thurs. Please reschedule.

## (undated) DEVICE — ANTIBACTERIAL UNDYED BRAIDED (POLYGLACTIN 910), SYNTHETIC ABSORBABLE SUTURE: Brand: COATED VICRYL

## (undated) DEVICE — BIT DRL EQUINOXE 2 AND 3.2MM

## (undated) DEVICE — DRAPE,SHOULDER,BEACH CHAIR,STERILE: Brand: MEDLINE

## (undated) DEVICE — STRYKER PERFORMANCE SERIES SAGITTAL BLADE: Brand: STRYKER PERFORMANCE SERIES

## (undated) DEVICE — HANDPIECE SET WITH HIGH FLOW TIP AND SUCTION TUBE: Brand: INTERPULSE

## (undated) DEVICE — PUMP PAIN AUTOFUSER AUTO SELCT NOBOLUS 1TO14ML/HR 550ML DISP

## (undated) DEVICE — GLV SURG SIGNATURE TOUCH PF LTX 8 STRL BX/50

## (undated) DEVICE — TRY L/P SFTY A/20G

## (undated) DEVICE — STERILE PVP: Brand: MEDLINE INDUSTRIES, INC.

## (undated) DEVICE — SUT MONOCRYL PLS ANTIB UND 3/0  PS1 27IN

## (undated) DEVICE — CVR HNDL LIGHT RIGID

## (undated) DEVICE — POSTN HD UNIV

## (undated) DEVICE — SKIN AFFIX SURG ADHESIVE 72/CS 0.55ML: Brand: MEDLINE

## (undated) DEVICE — SYR CATH/TIP 50ML 2OZ STRL 1P/U

## (undated) DEVICE — GLV SURG SIGNATURE ESSENTIAL PF LTX SZ7.5

## (undated) DEVICE — NDL SPINE 22G 31/2IN BLK

## (undated) DEVICE — SYR LUERLOK 30CC

## (undated) DEVICE — SYRINGE,PISTON,IRRIGATION,60ML,STERILE: Brand: MEDLINE

## (undated) DEVICE — DRSNG SURG AQUACEL AG 9X15CM

## (undated) DEVICE — CVR HNDL LT SURG ACCSSRY BLU STRL

## (undated) DEVICE — SST TWIST DRILL, STANDARD, 2.4MM DIA. X 127MM: Brand: MICROAIRE®

## (undated) DEVICE — PK MAJ SHLDR SPLT 10

## (undated) DEVICE — 3 BONE CEMENT MIXER: Brand: MIXEVAC

## (undated) DEVICE — GLV SURG TRIUMPH CLASSIC PF LTX 7.5 STRL

## (undated) DEVICE — T4 PULLOVER TOGA, LARGE

## (undated) DEVICE — NDL HYPO ECLPS SFTY 18G 1 1/2IN

## (undated) DEVICE — SLNG ULTRSLING2 11TO13IN MD